# Patient Record
Sex: MALE | Race: WHITE | Employment: OTHER | URBAN - METROPOLITAN AREA
[De-identification: names, ages, dates, MRNs, and addresses within clinical notes are randomized per-mention and may not be internally consistent; named-entity substitution may affect disease eponyms.]

---

## 2019-03-03 ENCOUNTER — HOSPITAL ENCOUNTER (EMERGENCY)
Age: 84
Discharge: HOME | End: 2019-03-03
Payer: MEDICARE

## 2019-03-03 ENCOUNTER — APPOINTMENT (OUTPATIENT)
Dept: GENERAL RADIOLOGY | Age: 84
End: 2019-03-03
Payer: MEDICARE

## 2019-03-03 VITALS
RESPIRATION RATE: 16 BRPM | HEART RATE: 70 BPM | BODY MASS INDEX: 24.38 KG/M2 | HEIGHT: 72 IN | DIASTOLIC BLOOD PRESSURE: 94 MMHG | SYSTOLIC BLOOD PRESSURE: 171 MMHG | TEMPERATURE: 97.8 F | OXYGEN SATURATION: 96 % | WEIGHT: 180 LBS

## 2019-03-03 DIAGNOSIS — R33.9 URINARY RETENTION: Primary | ICD-10-CM

## 2019-03-03 LAB
BACTERIA: ABNORMAL /HPF
BILIRUBIN URINE: NEGATIVE
BLOOD, URINE: ABNORMAL
CLARITY: CLEAR
COLOR: YELLOW
GLUCOSE URINE: NEGATIVE MG/DL
KETONES, URINE: NEGATIVE MG/DL
LEUKOCYTE ESTERASE, URINE: NEGATIVE
MICROSCOPIC EXAMINATION: YES
NITRITE, URINE: NEGATIVE
PH UA: 7 (ref 5–8)
PROTEIN UA: NEGATIVE MG/DL
RBC UA: ABNORMAL /HPF (ref 0–2)
SPECIFIC GRAVITY UA: 1.01 (ref 1–1.03)
URINE TYPE: ABNORMAL
UROBILINOGEN, URINE: 0.2 E.U./DL
WBC UA: ABNORMAL /HPF (ref 0–5)

## 2019-03-03 PROCEDURE — 51702 INSERT TEMP BLADDER CATH: CPT

## 2019-03-03 PROCEDURE — 99283 EMERGENCY DEPT VISIT LOW MDM: CPT

## 2019-03-03 PROCEDURE — 81001 URINALYSIS AUTO W/SCOPE: CPT

## 2019-03-03 PROCEDURE — 74022 RADEX COMPL AQT ABD SERIES: CPT

## 2020-09-25 ENCOUNTER — APPOINTMENT (OUTPATIENT)
Dept: GENERAL RADIOLOGY | Age: 85
End: 2020-09-25
Payer: MEDICARE

## 2020-09-25 ENCOUNTER — APPOINTMENT (OUTPATIENT)
Dept: CT IMAGING | Age: 85
End: 2020-09-25
Payer: MEDICARE

## 2020-09-25 ENCOUNTER — HOSPITAL ENCOUNTER (OUTPATIENT)
Age: 85
Setting detail: OBSERVATION
Discharge: HOME | End: 2020-09-26
Attending: EMERGENCY MEDICINE | Admitting: INTERNAL MEDICINE
Payer: MEDICARE

## 2020-09-25 PROBLEM — I10 HTN (HYPERTENSION): Status: ACTIVE | Noted: 2020-09-25

## 2020-09-25 PROBLEM — G45.9 TIA (TRANSIENT ISCHEMIC ATTACK): Status: ACTIVE | Noted: 2020-09-25

## 2020-09-25 LAB
A/G RATIO: 1.4 (ref 1.1–2.2)
ALBUMIN SERPL-MCNC: 3.7 G/DL (ref 3.4–5)
ALP BLD-CCNC: 82 U/L (ref 40–129)
ALT SERPL-CCNC: 13 U/L (ref 10–40)
ANION GAP SERPL CALCULATED.3IONS-SCNC: 10 MMOL/L (ref 3–16)
AST SERPL-CCNC: 18 U/L (ref 15–37)
BASOPHILS ABSOLUTE: 0 K/UL (ref 0–0.2)
BASOPHILS RELATIVE PERCENT: 0.3 %
BILIRUB SERPL-MCNC: 0.4 MG/DL (ref 0–1)
BILIRUBIN URINE: NEGATIVE
BLOOD, URINE: NEGATIVE
BUN BLDV-MCNC: 28 MG/DL (ref 7–20)
CALCIUM SERPL-MCNC: 8.7 MG/DL (ref 8.3–10.6)
CHLORIDE BLD-SCNC: 102 MMOL/L (ref 99–110)
CLARITY: CLEAR
CO2: 23 MMOL/L (ref 21–32)
COLOR: YELLOW
CREAT SERPL-MCNC: 1.2 MG/DL (ref 0.8–1.3)
EKG ATRIAL RATE: 71 BPM
EKG DIAGNOSIS: NORMAL
EKG P-R INTERVAL: 232 MS
EKG Q-T INTERVAL: 394 MS
EKG QRS DURATION: 90 MS
EKG QTC CALCULATION (BAZETT): 428 MS
EKG R AXIS: -7 DEGREES
EKG T AXIS: 32 DEGREES
EKG VENTRICULAR RATE: 71 BPM
EOSINOPHILS ABSOLUTE: 0.3 K/UL (ref 0–0.6)
EOSINOPHILS RELATIVE PERCENT: 8.7 %
GFR AFRICAN AMERICAN: >60
GFR NON-AFRICAN AMERICAN: 56
GLOBULIN: 2.6 G/DL
GLUCOSE BLD-MCNC: 102 MG/DL (ref 70–99)
GLUCOSE URINE: NEGATIVE MG/DL
HCT VFR BLD CALC: 33.5 % (ref 40.5–52.5)
HEMOGLOBIN: 11.6 G/DL (ref 13.5–17.5)
KETONES, URINE: NEGATIVE MG/DL
LEUKOCYTE ESTERASE, URINE: NEGATIVE
LYMPHOCYTES ABSOLUTE: 0.5 K/UL (ref 1–5.1)
LYMPHOCYTES RELATIVE PERCENT: 15.8 %
MCH RBC QN AUTO: 37.8 PG (ref 26–34)
MCHC RBC AUTO-ENTMCNC: 34.7 G/DL (ref 31–36)
MCV RBC AUTO: 108.9 FL (ref 80–100)
MICROSCOPIC EXAMINATION: NORMAL
MONOCYTES ABSOLUTE: 0.5 K/UL (ref 0–1.3)
MONOCYTES RELATIVE PERCENT: 13.4 %
NEUTROPHILS ABSOLUTE: 2.1 K/UL (ref 1.7–7.7)
NEUTROPHILS RELATIVE PERCENT: 61.8 %
NITRITE, URINE: NEGATIVE
PDW BLD-RTO: 14.6 % (ref 12.4–15.4)
PH UA: 7 (ref 5–8)
PLATELET # BLD: 170 K/UL (ref 135–450)
PMV BLD AUTO: 7.8 FL (ref 5–10.5)
POTASSIUM SERPL-SCNC: 4.5 MMOL/L (ref 3.5–5.1)
PRO-BNP: 1073 PG/ML (ref 0–449)
PROTEIN UA: NEGATIVE MG/DL
RBC # BLD: 3.08 M/UL (ref 4.2–5.9)
SODIUM BLD-SCNC: 135 MMOL/L (ref 136–145)
SPECIFIC GRAVITY UA: 1.01 (ref 1–1.03)
TOTAL PROTEIN: 6.3 G/DL (ref 6.4–8.2)
TROPONIN: <0.01 NG/ML
URINE REFLEX TO CULTURE: NORMAL
URINE TYPE: NORMAL
UROBILINOGEN, URINE: 1 E.U./DL
WBC # BLD: 3.4 K/UL (ref 4–11)

## 2020-09-25 PROCEDURE — 83880 ASSAY OF NATRIURETIC PEPTIDE: CPT

## 2020-09-25 PROCEDURE — 6370000000 HC RX 637 (ALT 250 FOR IP): Performed by: INTERNAL MEDICINE

## 2020-09-25 PROCEDURE — 70450 CT HEAD/BRAIN W/O DYE: CPT

## 2020-09-25 PROCEDURE — 71046 X-RAY EXAM CHEST 2 VIEWS: CPT

## 2020-09-25 PROCEDURE — 84484 ASSAY OF TROPONIN QUANT: CPT

## 2020-09-25 PROCEDURE — 99285 EMERGENCY DEPT VISIT HI MDM: CPT

## 2020-09-25 PROCEDURE — 2580000003 HC RX 258: Performed by: INTERNAL MEDICINE

## 2020-09-25 PROCEDURE — 80053 COMPREHEN METABOLIC PANEL: CPT

## 2020-09-25 PROCEDURE — G0378 HOSPITAL OBSERVATION PER HR: HCPCS

## 2020-09-25 PROCEDURE — 81003 URINALYSIS AUTO W/O SCOPE: CPT

## 2020-09-25 PROCEDURE — 93005 ELECTROCARDIOGRAM TRACING: CPT | Performed by: EMERGENCY MEDICINE

## 2020-09-25 PROCEDURE — 93010 ELECTROCARDIOGRAM REPORT: CPT | Performed by: INTERNAL MEDICINE

## 2020-09-25 PROCEDURE — 85025 COMPLETE CBC W/AUTO DIFF WBC: CPT

## 2020-09-25 RX ORDER — VITAMIN E 268 MG
800 CAPSULE ORAL DAILY
Status: DISCONTINUED | OUTPATIENT
Start: 2020-09-26 | End: 2020-09-26 | Stop reason: HOSPADM

## 2020-09-25 RX ORDER — SODIUM CHLORIDE 0.9 % (FLUSH) 0.9 %
10 SYRINGE (ML) INJECTION PRN
Status: DISCONTINUED | OUTPATIENT
Start: 2020-09-25 | End: 2020-09-26 | Stop reason: HOSPADM

## 2020-09-25 RX ORDER — ASPIRIN 81 MG/1
81 TABLET ORAL DAILY
Status: DISCONTINUED | OUTPATIENT
Start: 2020-09-26 | End: 2020-09-26 | Stop reason: HOSPADM

## 2020-09-25 RX ORDER — CYANOCOBALAMIN 1000 UG/ML
1000 INJECTION INTRAMUSCULAR; SUBCUTANEOUS ONCE
COMMUNITY

## 2020-09-25 RX ORDER — ACETAMINOPHEN 325 MG/1
650 TABLET ORAL EVERY 6 HOURS PRN
Status: DISCONTINUED | OUTPATIENT
Start: 2020-09-25 | End: 2020-09-26 | Stop reason: HOSPADM

## 2020-09-25 RX ORDER — SODIUM CHLORIDE 0.9 % (FLUSH) 0.9 %
10 SYRINGE (ML) INJECTION EVERY 12 HOURS SCHEDULED
Status: DISCONTINUED | OUTPATIENT
Start: 2020-09-25 | End: 2020-09-26 | Stop reason: HOSPADM

## 2020-09-25 RX ORDER — PROMETHAZINE HYDROCHLORIDE 25 MG/1
12.5 TABLET ORAL EVERY 6 HOURS PRN
Status: DISCONTINUED | OUTPATIENT
Start: 2020-09-25 | End: 2020-09-26 | Stop reason: HOSPADM

## 2020-09-25 RX ORDER — ONDANSETRON 2 MG/ML
4 INJECTION INTRAMUSCULAR; INTRAVENOUS EVERY 6 HOURS PRN
Status: DISCONTINUED | OUTPATIENT
Start: 2020-09-25 | End: 2020-09-26 | Stop reason: HOSPADM

## 2020-09-25 RX ORDER — ACETAMINOPHEN 650 MG/1
650 SUPPOSITORY RECTAL EVERY 6 HOURS PRN
Status: DISCONTINUED | OUTPATIENT
Start: 2020-09-25 | End: 2020-09-26 | Stop reason: HOSPADM

## 2020-09-25 RX ORDER — POLYETHYLENE GLYCOL 3350 17 G/17G
17 POWDER, FOR SOLUTION ORAL DAILY PRN
Status: DISCONTINUED | OUTPATIENT
Start: 2020-09-25 | End: 2020-09-26 | Stop reason: HOSPADM

## 2020-09-25 RX ADMIN — LABETALOL HYDROCHLORIDE 300 MG: 200 TABLET, FILM COATED ORAL at 21:41

## 2020-09-25 RX ADMIN — SODIUM CHLORIDE, PRESERVATIVE FREE 10 ML: 5 INJECTION INTRAVENOUS at 21:42

## 2020-09-25 ASSESSMENT — PAIN SCALES - GENERAL
PAINLEVEL_OUTOF10: 0
PAINLEVEL_OUTOF10: 0

## 2020-09-25 NOTE — ED NOTES
Fall risk screening completed.  Fall risk bracelet applied to patient.  Non-skid socks provided and placed on patient. Dustin Barbosa fall risk is indicated using  dome light .  Based on score, a bed alarm was indicated and applied.  The call light is within the patient's reach, and instructions for use were provided.  The bed is in the lowest position with wheels locked.  The patient has been advised to notify staff, using the call light, if there is a need to get up or use restroom.  The patient verbalized understanding of safety precautions and how to contact staff for assistance.          Charley Fried RN  09/25/20 1152

## 2020-09-25 NOTE — ED PROVIDER NOTES
I independently performed a history and physical on Laura Blanco. All diagnostic, treatment, and disposition decisions were made by myself in conjunction with the advanced practice provider. I have participated in the medical decision making and directed the treatment plan and disposition of the patient. For further details of [address] [place] [address] emergency department encounter, please see the advanced practice provider's documentation. CHIEF COMPLAINT  Chief Complaint   Patient presents with    Altered Mental Status     squad called by daughter-in-law; pt acting more confused today than usual; pt also c/o dizziness       Briefly, Laura Blanco is a 80 y.o. male  who presents to the ED complaining of acting abnormally transiently, slurring his words and confused which is not normal for him. He is without any complaint now. This lasted under an hour. He has little recollection of the event except he says he had trouble with speech briefly. No history of stroke or TIA. No chest pain or SOB or headaches. No cough or fevers. FOCUSED PHYSICAL EXAMINATION  BP (!) 175/61   Pulse 69   Temp 97.3 °F (36.3 °C) (Oral)   Resp 13   SpO2 100%    Focused physical examination notable for no acute distress, well-appearing, well-nourished, normal speech and mentation without obvious facial droop, no obvious rash. No obvious cranial nerve deficits on my initial exam. RRR CTAB, abd soft NTND, no focal motor/sensory deficits. NIHSS is 0. No dysmetria or ataxia.     The 12 lead EKG was interpreted by me as follows:  Rate: normal with a rate of 71  Rhythm: electronic atrial pacer  Axis: normal  Intervals: narrow QRS  ST segments: no ST elevations or depressions  T waves: no abnormal inversions  Non-specific T wave changes: not present  Prior EKG comparison: EKG dated 11/30/12 is not significantly different    MDM:  Diagnostic considerations included stroke, TIA, intracranial bleed, trauma, infection/sepsis, medication side effect, intoxication/withdrawal, metabolic/electrolyte abnormalities    ED course was notable for what sounds like a TIA with complete resolution of symptoms. Not confused/disoriented anymore. UA neg, labs reassuring, EKG with electronic atrial pacer. Despite CXR report of edema vs infiltrate he has no SOB, ESTEVES, or other cardiopulmonary sx at all to suggest an acute process in the lungs. CLINICAL IMPRESSION  1. Altered mental status, unspecified altered mental status type        DISPOSITION  Yarelis Landa was admitted in fair condition. The plan is to admit to the hospital at this time under the hospitalist service. Hospitalist accepted the patient and will take over the patient's care. This chart was created using Dragon dictation software. Efforts were made by me to ensure accuracy, however some errors may be present due to limitations of this technology.             Viktoria Mathis MD  09/25/20 [address], [place]  09/25/20 4674

## 2020-09-25 NOTE — ED NOTES
PT PROVIDED URINAL FOR URINE SAMPLE. PT STATES HE WILL NOT BE ABLE TO URINATE QUITE YET.       Ed Rahul, VAN  09/25/20 1997

## 2020-09-25 NOTE — ED PROVIDER NOTES
CHIEF COMPLAINT  Altered Mental Status (squad called by daughter-in-law; pt acting more confused today than usual; pt also c/o dizziness)      HISTORY OF PRESENT ILLNESS  Lucy Marrero is a 80 y.o. male who presents to the ED complaining of altered mental status. Patient is nontoxic in appearance and in no acute distress. Patient presents to the emergency department via EMS. Patient unclear as to why he is actually in the emergency department. Patient does not elicit any complaints to me. Apparently, EMS was contacted by daughter-in-law as patient was acting \"more confused\". Patient denies any headache or visual disturbances. No lightheadedness or dizziness. No chest pain, shortness of breath or difficulty breathing. No abdominal pain. No nausea or vomiting or bowel bladder complaints. No recent cough or congestion or URI-like symptoms. No fever or chills. Following HPI assessment, family did arrive and reported that patient was outside mowing the grass. States that he had come inside and he was noted to be more \"spacey\". Family states that patient appears to be having a hard time getting the words out and his speech did not sound clear. Family reports that episode lasted approximately 10 minutes or so by the time EMS arrived, he was returning back to normal.  Upon their arrival to the emergency department at this time, family at the bedside reports that patient has returned to his baseline. No prior history of TIA or CVA reported. No other complaints, modifying factors or associated symptoms. Nursing notes reviewed. Past Medical History:   Diagnosis Date    CAD (coronary artery disease)     Hypertension      Past Surgical History:   Procedure Laterality Date    PACEMAKER INSERTION       History reviewed. No pertinent family history. Social History     Socioeconomic History    Marital status:       Spouse name: Not on file    Number of children: Not on file    Years of education: Not on file    Highest education level: Not on file   Occupational History    Not on file   Social Needs    Financial resource strain: Not on file    Food insecurity     Worry: Not on file     Inability: Not on file    Transportation needs     Medical: Not on file     Non-medical: Not on file   Tobacco Use    Smoking status: Never Smoker    Smokeless tobacco: Never Used   Substance and Sexual Activity    Alcohol use: Yes     Comment: ONCE A YEAR    Drug use: No    Sexual activity: Not on file   Lifestyle    Physical activity     Days per week: Not on file     Minutes per session: Not on file    Stress: Not on file   Relationships    Social connections     Talks on phone: Not on file     Gets together: Not on file     Attends Orthodoxy service: Not on file     Active member of club or organization: Not on file     Attends meetings of clubs or organizations: Not on file     Relationship status: Not on file    Intimate partner violence     Fear of current or ex partner: Not on file     Emotionally abused: Not on file     Physically abused: Not on file     Forced sexual activity: Not on file   Other Topics Concern    Not on file   Social History Narrative    Not on file     No current facility-administered medications for this encounter. Current Outpatient Medications   Medication Sig Dispense Refill    aspirin 81 MG tablet Take 81 mg by mouth daily.  labetalol (NORMODYNE) 300 MG tablet Take 300 mg by mouth 2 times daily.  Multiple Vitamins-Minerals (MULTIVITAMIN PO) Take  by mouth daily.  vitamin E 1000 UNITS capsule Take 1,000 Units by mouth daily.  Cyanocobalamin (VITAMIN B 12 PO) Take  by mouth daily.          No Known Allergies    REVIEW OF SYSTEMS  10 systems reviewed, pertinent positives per HPI otherwise noted to be negative    PHYSICAL EXAM  BP (!) 169/81   Pulse 72   Temp 97.3 °F (36.3 °C) (Oral)   Resp 18   SpO2 100%   GENERAL APPEARANCE: Awake and alert. Cooperative. No acute distress. HEAD: Normocephalic. Atraumatic. EYES: EOM's grossly intact. Bilateral conjunctiva clear and without exudate. No conjunctival injection bilaterally. No scleral icterus. ENT: Mucous membranes are moist. Bilateral tympanic membranes are clear. No hemotympanum. Posterior oropharynx is without erythema or exudate. Uvula is midline. NECK: Supple. Normal ROM. Trachea is midline. CHEST: Equal and symmetric chest rise and fall. HEART: Regular rate and rhythm without murmurs. LUNGS: Respirations unlabored. Speaking comfortably in full sentences. Bilateral lung sounds clear to auscultation without wheezing, rhonchi or rales. ABDOMEN: Soft, non-distended and non-tender. No hernias or masses appreciated. No organomegaly. No rebound or guarding. Bowel sounds audible and active in all four quadrants. EXTREMITIES: Moves all extremities equally and neurovascularly intact. No edema. SKIN: Pink,warm and dry. No acute rashes. NEUROLOGICAL: Alert and oriented x4, speech clear. CN's 2-12 intact. No gross facial drooping. Strength is 5/5 in all extremities and sensation intact. No ataxia. PSYCHIATRIC: Normal mood and affect. RADIOLOGY  Xr Chest (2 Vw)    Result Date: 9/25/2020  EXAMINATION: TWO XRAY VIEWS OF THE CHEST 9/25/2020 1:05 pm COMPARISON: 05/27/2006. HISTORY: ORDERING SYSTEM PROVIDED HISTORY: AMS TECHNOLOGIST PROVIDED HISTORY: Reason for exam:->AMS FINDINGS: The cardiomediastinal silhouette is unremarkable. Aortic vascular calcification. There is mild increase in the perihilar markings bilaterally, possibly mild perihilar edema or infiltrate. The lungs otherwise are clear. There is no pleural fluid. Left-sided transvenous pacer. No acute osseous findings. Mild increase in the perihilar markings bilaterally, possibly mild edema or infiltrate.      Ct Head Wo Contrast    Result Date: 9/25/2020  EXAMINATION: CT OF THE HEAD WITHOUT CONTRAST  9/25/2020 1:19 pm TECHNIQUE: CT of the head was performed without the administration of intravenous contrast. Dose modulation, iterative reconstruction, and/or weight based adjustment of the mA/kV was utilized to reduce the radiation dose to as low as reasonably achievable. COMPARISON: None. HISTORY: ORDERING SYSTEM PROVIDED HISTORY: AMS TECHNOLOGIST PROVIDED HISTORY: If patient is on cardiac monitor and/or pulse ox, they may be taken off cardiac monitor and pulse ox, left on O2 if currently on. All monitors reattached when patient returns to room. Has a \"code stroke\" or \"stroke alert\" been called? ->No Reason for exam:->AMS Reason for Exam: ams Acuity: Acute Type of Exam: Initial FINDINGS: BRAIN/VENTRICLES: There is no acute intracranial hemorrhage, mass effect or midline shift. No abnormal extra-axial fluid collection. The gray-white differentiation is maintained without evidence of an acute infarct. There is no evidence of hydrocephalus. There is mild diffuse volume loss with periventricular white matter subcortical white matter changes suggestive of small vessel ischemic change ORBITS: The visualized portion of the orbits demonstrate no acute abnormality. SINUSES: The visualized paranasal sinuses and mastoid air cells demonstrate no acute abnormality. SOFT TISSUES/SKULL:  No acute abnormality of the visualized skull or soft tissues. No acute intracranial abnormality. Mild volume loss with white matter changes bilaterally suggestive of small vessel ischemic change     NIH Stroke Scale     Interval: Baseline  Time: 12:44 PM  Person Administering Scale: Bertha Schlichter   Administer stroke scale items in the order listed. Record performance in each category after each subscale exam. Do not go back and change scores. Follow directions provided for each exam technique. Scores should reflect what the patient does, not what the clinician thinks the patient can do.  The clinician should record answers while administering the exam and work quickly. Except where indicated, the patient should not be coached (i.e., repeated requests to patient to make a special effort). 1a  Level of consciousness: 0=alert; keenly responsive   1b. LOC questions:  0=Performs both tasks correctly   1c. LOC commands: 0=Performs both tasks correctly   2. Best Gaze: 0=normal   3. Visual: 0=No visual loss   4. Facial Palsy: 0=Normal symmetric movement   5a. Motor left arm: 0=No drift, limb holds 90 (or 45) degrees for full 10 seconds   5b. Motor right arm: 0=No drift, limb holds 90 (or 45) degrees for full 10 seconds   6a. motor left le=No drift, limb holds 90 (or 45) degrees for full 10 seconds   6b  Motor right le=No drift, limb holds 90 (or 45) degrees for full 10 seconds   7. Limb Ataxia: 0=Absent   8. Sensory: 0=Normal; no sensory loss   9. Best Language:  0=No aphasia, normal   10. Dysarthria: 0=Normal   11. Extinction and Inattention: 0=No abnormality         Total:  0         ED COURSE  I have evaluated this patient in collaboration with Dr. Gregorio Wolff. Patient seen and evaluated. Old records reviewed. Patient presenting for evaluation of episode of altered mentation, concerning for TIA. Afebrile. Vital stable. Not tachycardic or tachypneic. Not hypoxic. Well-appearing. Urinalysis negative for hematuria or infection. CBC without leukocytosis, stable anemia. CMP with elevated BUN of 28, GFR 56 and otherwise stable. Troponin negative. EKG interpretation per attending, please see his documentation for additional details. BNP 1073. This was obtained following chest x-ray read which reported mild increase in the perihilar markings bilaterally, possibly mild edema or infiltrate. CT head negative for acute intracranial abnormality. A discussion was had with the patient and family regarding lab and imaging results as well as plan of care. Plan for admission to the hospital for further evaluation/treatment.   Patient verbalizes understanding, all questions were answered and he is agreeable with the plan of care. I spoke with Dr. Nick Ferro. We thoroughly discussed the history, physical exam, laboratory and imaging studies, as well as, emergency department course. Based upon that discussion, we've decided to admit Tristin Valdez to the hospital for further evaulation/treatment.         MDM  Results for orders placed or performed during the hospital encounter of 09/25/20   CBC auto differential   Result Value Ref Range    WBC 3.4 (L) 4.0 - 11.0 K/uL    RBC 3.08 (L) 4.20 - 5.90 M/uL    Hemoglobin 11.6 (L) 13.5 - 17.5 g/dL    Hematocrit 33.5 (L) 40.5 - 52.5 %    .9 (H) 80.0 - 100.0 fL    MCH 37.8 (H) 26.0 - 34.0 pg    MCHC 34.7 31.0 - 36.0 g/dL    RDW 14.6 12.4 - 15.4 %    Platelets 247 450 - 962 K/uL    MPV 7.8 5.0 - 10.5 fL    Neutrophils % 61.8 %    Lymphocytes % 15.8 %    Monocytes % 13.4 %    Eosinophils % 8.7 %    Basophils % 0.3 %    Neutrophils Absolute 2.1 1.7 - 7.7 K/uL    Lymphocytes Absolute 0.5 (L) 1.0 - 5.1 K/uL    Monocytes Absolute 0.5 0.0 - 1.3 K/uL    Eosinophils Absolute 0.3 0.0 - 0.6 K/uL    Basophils Absolute 0.0 0.0 - 0.2 K/uL   Comprehensive metabolic panel   Result Value Ref Range    Sodium 135 (L) 136 - 145 mmol/L    Potassium 4.5 3.5 - 5.1 mmol/L    Chloride 102 99 - 110 mmol/L    CO2 23 21 - 32 mmol/L    Anion Gap 10 3 - 16    Glucose 102 (H) 70 - 99 mg/dL    BUN 28 (H) 7 - 20 mg/dL    CREATININE 1.2 0.8 - 1.3 mg/dL    GFR Non- 56 (A) >60    GFR African American >60 >60    Calcium 8.7 8.3 - 10.6 mg/dL    Total Protein 6.3 (L) 6.4 - 8.2 g/dL    Alb 3.7 3.4 - 5.0 g/dL    Albumin/Globulin Ratio 1.4 1.1 - 2.2    Total Bilirubin 0.4 0.0 - 1.0 mg/dL    Alkaline Phosphatase 82 40 - 129 U/L    ALT 13 10 - 40 U/L    AST 18 15 - 37 U/L    Globulin 2.6 g/dL   Urinalysis Reflex to Culture    Specimen: Urine, clean catch   Result Value Ref Range    Color, UA Yellow Straw/Yellow    Clarity, UA Clear Clear Glucose, Ur Negative Negative mg/dL    Bilirubin Urine Negative Negative    Ketones, Urine Negative Negative mg/dL    Specific Gravity, UA 1.015 1.005 - 1.030    Blood, Urine Negative Negative    pH, UA 7.0 5.0 - 8.0    Protein, UA Negative Negative mg/dL    Urobilinogen, Urine 1.0 <2.0 E.U./dL    Nitrite, Urine Negative Negative    Leukocyte Esterase, Urine Negative Negative    Microscopic Examination Not Indicated     Urine Type NotGiven     Urine Reflex to Culture Not Indicated    Troponin   Result Value Ref Range    Troponin <0.01 <0.01 ng/mL   Brain Natriuretic Peptide   Result Value Ref Range    Pro-BNP 1,073 (H) 0 - 449 pg/mL   EKG 12 Lead   Result Value Ref Range    Ventricular Rate 71 BPM    Atrial Rate 71 BPM    P-R Interval 232 ms    QRS Duration 90 ms    Q-T Interval 394 ms    QTc Calculation (Bazett) 428 ms    R Axis -7 degrees    T Axis 32 degrees    Diagnosis       Electronic atrial pacemakerSeptal infarct (cited on or before 30-NOV-2012)Abnormal ECGWhen compared with ECG of 30-NOV-2012 10:49,Electronic atrial pacemaker has replaced Sinus rhythmQuestionable change in initial forces of Septal leads         Final Impression    1. Altered mental status, unspecified altered mental status type        Blood pressure (!) 156/107, pulse 81, temperature 97.3 °F (36.3 °C), temperature source Oral, resp. rate 23, SpO2 97 %. DISPOSITION  Patient was admitted to the hospital.       DISCLAIMER:  Please note this report has been produced using speech recognition Dragon software and may contain errors related to that system including errors in grammar, punctuation, and spelling, as well as words and phrases that may be inappropriate. If there are any questions or concerns please feel free to contact the dictating provider for clarification.                 Garcia Lopez, APRN - [address]   09/25/20 4609

## 2020-09-25 NOTE — ED NOTES
Bed: 07  Expected date:   Expected time:   Means of arrival:   Comments:  regulo Almonte RN  09/25/20 8955

## 2020-09-25 NOTE — H&P
Hospital Medicine History & Physical      PCP: DAVID Bird CNP    Date of Admission: 9/25/2020    Date of Service: Pt seen/examined on 9/25/2020 and Placed in Observation. Chief Complaint: Altered mental status      History Of Present Illness:    80 y.o. male who presented to [facility] with above complaints  Patient was apparently normal today while he was mowing the lawn. After working out in the yard, he went inside the house and daughter-in-law stated that the patient was acting abnormal.  Patient apparently had a blank look on his face, patient had a hard time getting any words out and his speech was unclear, patient reported left hand tingling. This lasted for about 10 minutes until EMS was called, and by the time patient arrived to the ED he was completely back to baseline. Patient reports he had an episode yesterday where the left hand was tingling. Otherwise denies any focal weakness or numbness. No facial droop reported. Patient denies prior history of stroke or TIA. He has history of hypertension for which he takes labetalol and has a pacemaker in situ. He reportedly takes baby aspirin every day at 5 AM.    Interestingly patient's BNP was elevated and chest x-ray shows possible pulmonary edema, but patient denies any exertional shortness of breath, no leg edema orthopnea or PND. He has had longstanding hypertension. -- Nuclear stress test June 7, 2006 - no evidence of ischemia and estimated ejection fraction of 43%. -- Echocardiogram August 12, 2003 - ejection fraction of 50%. Underwent a dual-chamber pacemaker implantation which was initially done on September 19, 2002.  Since the pacemaker implantation he has not had any episodes of near syncope or syncope      Past Medical History:          Diagnosis Date    CAD (coronary artery disease)     Hypertension        Past Surgical History:          Procedure Laterality Date    PACEMAKER INSERTION         Medications normal bowel sounds. Musculoskeletal:  No clubbing, cyanosis or edema bilaterally. Full range of motion without deformity. Skin: Skin color, texture, turgor normal.  No rashes or lesions. Neurologic:  Neurovascularly intact without any focal sensory/motor deficits. Cranial nerves: II-XII intact, grossly non-focal.  Psychiatric:  Alert and oriented, thought content appropriate, normal insight  Capillary Refill: Brisk,< 3 seconds   Peripheral Pulses: +2 palpable, equal bilaterally       Labs:     Recent Labs     09/25/20  1248   WBC 3.4*   HGB 11.6*   HCT 33.5*        Recent Labs     09/25/20  1248   *   K 4.5      CO2 23   BUN 28*   CREATININE 1.2   CALCIUM 8.7     Recent Labs     09/25/20  1248   AST 18   ALT 13   BILITOT 0.4   ALKPHOS 82     No results for input(s): INR in the last 72 hours. Recent Labs     09/25/20  1248   TROPONINI <0.01       Urinalysis:      Lab Results   Component Value Date    NITRU Negative 09/25/2020    WBCUA None seen 03/03/2019    BACTERIA Rare 03/03/2019    RBCUA 10-20 03/03/2019    BLOODU Negative 09/25/2020    SPECGRAV 1.015 09/25/2020    GLUCOSEU Negative 09/25/2020       Radiology:     Reviewed the CT head and chest x-ray personally and also reviewed radiologist dictation    EKG:  I have reviewed the EKG with the following interpretation: Electronic atrial pacemaker rhythm, PVC    CT HEAD WO CONTRAST   Final Result   No acute intracranial abnormality. Mild volume loss with white matter changes bilaterally suggestive of small   vessel ischemic change         XR CHEST (2 VW)   Final Result   Mild increase in the perihilar markings bilaterally, possibly mild edema or   infiltrate. MRI BRAIN WO CONTRAST    (Results Pending)   VL DUP CAROTID BILATERAL    (Results Pending)       ASSESSMENT:PLAN:    Suspected TIA  10 min episode of transient confusion, speech deficit, left hand tingling.   Spontaneous resolution with no other neurological deficits  Initial CT head negative  - Other DDx  including heme/metabolic derangements, encephalopathy, seizures, migraines, syncope seems less likely  - Neuroimaging -  MRI brain w/o contrast, carotid doppler, 2D ECHO ordered as indicated  - On ASA 81 mg daily  - EKG reviewed, order telemetry (review for Afib or other suggestion of cardiac embolic source)  - Aggressive risk factor management of HTN, HLD,   - f/u labs including lipid profile, A1C  - Consider neurology consult if MRI brain abnormal    Suspected CHF  No clinical symptoms of CHF. But BNP elevated 1073, abnormal CXR findings perihilar edema. Could have diastolic CHF based on longstanding hypertension  -2D echocardiogram ordered and pending    Longstanding hypertension  Elevated, not at goal.  Resume labetalol and increase dose if needed to achieve goal BP    SA node dysfunction  S/p PPM placement  Follows up with [facility]      DVT Prophylaxis: Lovenox  Diet: DIET GENERAL;  Code Status: Full Code    PT/OT Eval Status: Ambulatory    Dispo -observation       Saima Pozo MD    Thank you DAVID Marinelli - KELVIN for the opportunity to be involved in this patient's care. If you have any questions or concerns please feel free to contact me at [phone].

## 2020-09-25 NOTE — PROGRESS NOTES
4 Eyes Skin Assessment     The patient is being assess for   Admission    I agree that 2 RN's have performed a thorough Head to Toe Skin Assessment on the patient. ALL assessment sites listed below have been assessed. Areas assessed by both nurses:   [x]   Head, Face, and Ears   [x]   Shoulders, Back, and Chest, Abdomen  [x]   Arms, Elbows, and Hands   [x]   Coccyx, Sacrum, and Ischium  [x]   Legs, Feet, and Heels        Dry flaky heels, scattered scabs on arms, discoloration to bilat arms, coccyx red and blachable. **SHARE this note so that the co-signing nurse is able to place an eSignature**    Co-signer eSignature: Electronically signed by Charisse Peres RN on 9/25/20 at 10:21 PM EDT    Does the Patient have Skin Breakdown?   No          Solomon Prevention initiated:  No   Wound Care Orders initiated:  No      WOC nurse consulted for Pressure Injury (Stage 3,4, Unstageable, DTI, NWPT, Complex wounds)and New or Established Ostomies:  No      Primary Nurse eSignature: Electronically signed by Tamara Jones RN on 9/25/20 at 6:20 PM EDT

## 2020-09-25 NOTE — ED NOTES
Terrence Carroll@"[employer]  Re: admit.  AMS per [email or web address]  09/25/20 7091

## 2020-09-26 VITALS
OXYGEN SATURATION: 98 % | DIASTOLIC BLOOD PRESSURE: 74 MMHG | SYSTOLIC BLOOD PRESSURE: 165 MMHG | TEMPERATURE: 97.7 F | BODY MASS INDEX: 23.41 KG/M2 | WEIGHT: 172.84 LBS | HEART RATE: 69 BPM | RESPIRATION RATE: 16 BRPM | HEIGHT: 72 IN

## 2020-09-26 LAB
CHOLESTEROL, TOTAL: 186 MG/DL (ref 0–199)
HDLC SERPL-MCNC: 42 MG/DL (ref 40–60)
LDL CHOLESTEROL CALCULATED: 133 MG/DL
LV EF: 58 %
LVEF MODALITY: NORMAL
TRIGL SERPL-MCNC: 57 MG/DL (ref 0–150)
VLDLC SERPL CALC-MCNC: 11 MG/DL

## 2020-09-26 PROCEDURE — 36415 COLL VENOUS BLD VENIPUNCTURE: CPT

## 2020-09-26 PROCEDURE — 83036 HEMOGLOBIN GLYCOSYLATED A1C: CPT

## 2020-09-26 PROCEDURE — 96372 THER/PROPH/DIAG INJ SC/IM: CPT

## 2020-09-26 PROCEDURE — G0378 HOSPITAL OBSERVATION PER HR: HCPCS

## 2020-09-26 PROCEDURE — 93306 TTE W/DOPPLER COMPLETE: CPT

## 2020-09-26 PROCEDURE — 99219 PR INITIAL OBSERVATION CARE/DAY 50 MINUTES: CPT | Performed by: PSYCHIATRY & NEUROLOGY

## 2020-09-26 PROCEDURE — 2580000003 HC RX 258: Performed by: INTERNAL MEDICINE

## 2020-09-26 PROCEDURE — 6370000000 HC RX 637 (ALT 250 FOR IP): Performed by: INTERNAL MEDICINE

## 2020-09-26 PROCEDURE — 80061 LIPID PANEL: CPT

## 2020-09-26 PROCEDURE — 6360000002 HC RX W HCPCS: Performed by: INTERNAL MEDICINE

## 2020-09-26 RX ORDER — ATORVASTATIN CALCIUM 40 MG/1
40 TABLET, FILM COATED ORAL NIGHTLY
Status: DISCONTINUED | OUTPATIENT
Start: 2020-09-26 | End: 2020-09-26 | Stop reason: HOSPADM

## 2020-09-26 RX ORDER — ATORVASTATIN CALCIUM 40 MG/1
40 TABLET, FILM COATED ORAL NIGHTLY
Qty: 30 TABLET | Refills: 3 | Status: SHIPPED | OUTPATIENT
Start: 2020-09-26

## 2020-09-26 RX ADMIN — SODIUM CHLORIDE, PRESERVATIVE FREE 10 ML: 5 INJECTION INTRAVENOUS at 09:22

## 2020-09-26 RX ADMIN — ASPIRIN 81 MG: 81 TABLET, COATED ORAL at 09:19

## 2020-09-26 RX ADMIN — Medication 800 UNITS: at 09:24

## 2020-09-26 RX ADMIN — ENOXAPARIN SODIUM 40 MG: 40 INJECTION SUBCUTANEOUS at 09:19

## 2020-09-26 RX ADMIN — LABETALOL HYDROCHLORIDE 300 MG: 200 TABLET, FILM COATED ORAL at 09:19

## 2020-09-26 ASSESSMENT — PAIN SCALES - GENERAL: PAINLEVEL_OUTOF10: 0

## 2020-09-26 NOTE — CONSULTS
In patient Neurology consult        [facility] Neurology      MD Paty Ball  3/17/1924    Date of Service: 9/26/2020    Referring Physician: Anyi Nieto MD      Reason for the consult and CC: Acute encephalopathy and speech impairment. Possible TIA. HPI:   The patient is a 80y.o.  years old male with history of hypertension, CAD and status post pacemaker placement who was admitted to the hospital yesterday with acute encephalopathy and speech impairment. Symptoms started yesterday few hours prior to admission. He was working out in his yard when he suddenly came inside his house and was somewhat acting not normal.  He was not following direction with a staring look in his face and slurred speech. He was having word finding difficulties. Degree was severe. Duration was at least 10 to 15 minutes. Other associated symptoms included left hand tingling. No headache, chest pain, dysphagia or focal weakness. No falling or injury. No other leaving aggravating factors. Paramedics arrived and the patient was taken to the ER where he was back to his baseline. Initial CT of the head showed no acute findings. He was admitted. Today he denies any new symptoms. No headache, chest pain, dysphagia or dysarthria. No prior history of strokes. Other review of system was unremarkable.       Family history: Noncontributory      Past Surgical History:   Procedure Laterality Date    PACEMAKER INSERTION          Past Medical History:   Diagnosis Date    CAD (coronary artery disease)     Hypertension      Social History     Tobacco Use    Smoking status: Never Smoker    Smokeless tobacco: Never Used   Substance Use Topics    Alcohol use: Yes     Comment: ONCE A YEAR    Drug use: No     No Known Allergies  Current Facility-Administered Medications   Medication Dose Route Frequency Provider Last Rate Last Dose    atorvastatin (LIPITOR) tablet 40 mg  40 mg Oral Nightly Angela Torre APRN - CNP        aspirin EC tablet 81 mg  81 mg Oral Daily Genevieve Hampton MD   81 mg at 09/26/20 0919    labetalol (NORMODYNE) tablet 300 mg  300 mg Oral BID Genevieve Hampton MD   300 mg at 09/26/20 0919    vitamin E capsule 800 Units  800 Units Oral Daily Genevieve Hampton MD   800 Units at 09/26/20 2974    sodium chloride flush 0.9 % injection 10 mL  10 mL Intravenous 2 times per day Genevieve Hampton MD   10 mL at 09/26/20 2535    sodium chloride flush 0.9 % injection 10 mL  10 mL Intravenous PRN Genevieve Hampton MD        acetaminophen (TYLENOL) tablet 650 mg  650 mg Oral Q6H PRN Genevieve Hampton MD        Or   Mohan acetaminophen (TYLENOL) suppository 650 mg  650 mg Rectal Q6H PRN Genevieve Hampton MD        polyethylene glycol (GLYCOLAX) packet 17 g  17 g Oral Daily PRN Genevieve Hampton MD        promethazine (PHENERGAN) tablet 12.5 mg  12.5 mg Oral Q6H PRN Genevieve Hampton MD        Or    ondansetron (ZOFRAN) injection 4 mg  4 mg Intravenous Q6H PRN Genevieve Hampton MD        enoxaparin (LOVENOX) injection 40 mg  40 mg Subcutaneous Daily Genevieve Hampton MD   40 mg at 09/26/20 0919       ROS : A 10-14 system review of constitutional, cardiovascular, respiratory, eyes, musculoskeletal, endocrine, GI, ENT, skin, hematological, genitourinary, psychiatric and neurologic systems was obtained and updated today and is unremarkable except as mentioned in my HPI      Exam:     Constitutional:   Vitals:    09/25/20 2331 09/26/20 0345 09/26/20 0745 09/26/20 0900   BP: 133/69 137/75 (!) 165/74    Pulse: 63 67 69    Resp: 16 16 16    Temp: 97.9 °F (36.6 °C) 99 °F (37.2 °C) 97.7 °F (36.5 °C)    TempSrc: Oral Oral Oral    SpO2: 96% 97% 98%    Weight:    172 lb 13.5 oz (78.4 kg)   Height:           General appearance:  Normal development and appear in no acute distress. Eye: No icterus.    Fundus: Funduscopic examination cannot be performed due to COVID19 restrictions and precautions. Neck: supple  Cardiovascular: No lower leg edema with good pulsation. Mental Status:   Oriented to person, place, problem, and time. Memory: Aware of recent and remote event. Good immediate recall. Intact remote memory  Normal attention span and concentration. Language: intact naming, repeating and fluency   Good fund of Knowledge. Aware of current events and vocabulary   Cranial Nerves:   II: Visual fields: Full. Pupils: equal, round, reactive to light  III,IV,VI: Extra Ocular Movements are intact. No nystagmus  V: Facial sensation is intact   VII: Facial strength and movements: intact and symmetric  VIII: Hearing: Intact to finger rub bilaterally  IX: Palate elevation is symmetric  XI: Shoulder shrug is intact  XII: Tongue movements are normal  Musculoskeletal: 5/5 in all 4 extremities. Tone: Normal tone. Reflexes: 2+ in the arms 1+ in the leg Planters: flexor bilaterally. Coordination: no pronator drift, no dysmetria with FNF in upper extremities. Normal REM. Sensation: normal to all modalities in both arms and legs.   Gait/Posture: Not tested due to poor cooperation with the patient   Data:  LABS:   Lab Results   Component Value Date     09/25/2020    K 4.5 09/25/2020     09/25/2020    CO2 23 09/25/2020    BUN 28 09/25/2020    CREATININE 1.2 09/25/2020    GFRAA >60 09/25/2020    GFRAA >60 11/30/2012    LABGLOM 56 09/25/2020    GLUCOSE 102 09/25/2020    CALCIUM 8.7 09/25/2020     Lab Results   Component Value Date    WBC 3.4 09/25/2020    RBC 3.08 09/25/2020    HGB 11.6 09/25/2020    HCT 33.5 09/25/2020    .9 09/25/2020    RDW 14.6 09/25/2020     09/25/2020     Lab Results   Component Value Date    INR 0.98 11/30/2012    PROTIME 11.2 11/30/2012       Neuroimaging were independently reviewed by myself and discussed results with the patient  Reviewed notes from different physicians  Reviewed lab and blood testing    Impression:  Acute encephalopathy and speech impairment, severe. Likely TIA versus CVA. Less likely seizure  Hypertension, not controlled  CAD  Pacemaker placement  Hyponatremia    Recommendation:  MRI brain if pacemaker is compatible with MRI  Echo  Carotid Doppler  PT and OT  Aspirin  Statin  Hydration and follow electrolytes  Blood pressure monitor and resume home blood pressure medications  Speech evaluation  Follow sodium level  DVT and GI prophylaxis  Stroke prevention was discussed with the patient and his son today  Follow-up with his cardiologist regarding his pacemaker and possible interrogation  Can be discharged when medically stable  No need to change AP therapy at this point          Thank you for referring such patient. If you have any questions regarding my consult note, please don't hesitate to call me. Chula Reza MD  [phone]    This dictation was generated by voice recognition computer software.  Although all attempts are made to edit the dictation for accuracy, there may be errors in the  transcription that are not intended

## 2020-09-26 NOTE — PLAN OF CARE
Bed in lowest position, wheels locked, 2/4 side rails up, nonskid footwear on. Bed/ chair check alarm in place, call light within reach. Pt instructed to call out when needing assistance. Pt stated understanding. Nurse will continue to monitor.         Problem: Falls - Risk of:  Goal: Will remain free from falls  Description: Will remain free from falls  Outcome: Ongoing     Problem: Falls - Risk of:  Goal: Absence of physical injury  Description: Absence of physical injury  Outcome: Ongoing

## 2020-09-26 NOTE — PLAN OF CARE
Discharge instructions given with good understanding shown to patient and son. Discharged via wc to car @ front door with belongings. Condition satisfactory on discharge.

## 2020-09-27 LAB
ESTIMATED AVERAGE GLUCOSE: 114 MG/DL
HBA1C MFR BLD: 5.6 %

## 2020-09-30 ENCOUNTER — HOSPITAL ENCOUNTER (OUTPATIENT)
Dept: VASCULAR LAB | Age: 85
Discharge: HOME | End: 2020-09-30
Payer: MEDICARE

## 2020-09-30 PROCEDURE — 93880 EXTRACRANIAL BILAT STUDY: CPT

## 2020-09-30 NOTE — DISCHARGE SUMMARY
Hospital Medicine Discharge Summary    Patient ID: Tato Esters      Patient's PCP: DAVID Villasenor CNP    Admit Date: 9/25/2020     Discharge Date: 9/26/2020      Admitting Physician: Didi Mitchell MD     Discharge Physician: DAVID Ibarra CNP     Discharge Diagnoses: Active Hospital Problems    Diagnosis    Altered mental status [R41.82]    TIA (transient ischemic attack) [G45.9]    HTN (hypertension), benign [I10]    Sinoatrial node dysfunction ([address] [I49.5]    Cardiac pacemaker in situ [Z95.0]       The patient was seen and examined on day of discharge and this discharge summary is in conjunction with any daily progress note from day of discharge. Hospital Course:     80 y.o. male who presented to [facility] with above complaints  Patient was apparently normal today while he was mowing the lawn. After working out in the yard, he went inside the house and daughter-in-law stated that the patient was acting abnormal.  Patient apparently had a blank look on his face, patient had a hard time getting any words out and his speech was unclear, patient reported left hand tingling. This lasted for about 10 minutes until EMS was called, and by the time patient arrived to the ED he was completely back to baseline. Patient reports he had an episode yesterday where the left hand was tingling. Otherwise denies any focal weakness or numbness. No facial droop reported. Patient denies prior history of stroke or TIA. He has history of hypertension for which he takes labetalol and has a pacemaker in situ. He reportedly takes baby aspirin every day at 5 AM.       Suspected TIA  10 min episode of transient confusion, speech deficit, left hand tingling.   Spontaneous resolution with no other neurological deficits  Initial CT head negative  - Other DDx  including heme/metabolic derangements, encephalopathy, seizures, migraines, syncope seems less likely  - unable to have MRI given incompatible pacemaker.  - ECHO - EF 55-60% with grade II DD.    - CUS not available on the weekend. Discussed with the patient and son at the bedside. The patient wishes to be discharged and have an outpatient CUS. Informed patient we are looking for blockage in the carotid arteries that could lead to stroke. He is aware and so is the son and they wish to have procedure as outpatient, which I ordered. - On ASA 81 mg daily  - neurology consulted. - Aggressive risk factor management of HTN, HLD,   - , A1c 5.6.     Suspected CHF  No clinical symptoms of CHF. But BNP elevated 1073, abnormal CXR findings perihilar edema. Longstanding hypertension  Elevated, not at goal.  Resume labetalol.      SA node dysfunction  S/p PPM placement  Follows up with [facility]               Physical Exam Performed:     BP (!) 165/74   Pulse 69   Temp 97.7 °F (36.5 °C) (Oral)   Resp 16   Ht 6' (1.829 m)   Wt 172 lb 13.5 oz (78.4 kg)   SpO2 98%   BMI 23.44 kg/m²       General appearance:  No apparent distress, appears stated age and cooperative. HEENT:  Normal cephalic, atraumatic without obvious deformity. Pupils equal, round, and reactive to light. Extra ocular muscles intact. Conjunctivae/corneas clear. Neck: Supple, with full range of motion. No jugular venous distention. Trachea midline. Respiratory:  Normal respiratory effort. Clear to auscultation, bilaterally without Rales/Wheezes/Rhonchi. Cardiovascular:  Regular rate and rhythm with normal S1/S2 without murmurs, rubs or gallops. Abdomen: Soft, non-tender, non-distended with normal bowel sounds. Musculoskeletal:  No clubbing, cyanosis or edema bilaterally. Full range of motion without deformity. Skin: Skin color, texture, turgor normal.  No rashes or lesions. Neurologic:  Neurovascularly intact without any focal sensory/motor deficits.  Cranial nerves: II-XII intact, grossly non-focal.  Psychiatric:  Alert and oriented, thought minutes in the examination, evaluation, counseling and review of medications and discharge plan. Signed:    DAVID Franco CNP   9/30/2020      Thank you DAVID Deleon CNP for the opportunity to be involved in this patient's care. If you have any questions or concerns please feel free to contact me at [phone].

## 2021-01-01 ENCOUNTER — APPOINTMENT (OUTPATIENT)
Dept: CT IMAGING | Age: 86
DRG: 480 | End: 2021-01-01
Payer: MEDICARE

## 2021-01-01 ENCOUNTER — APPOINTMENT (OUTPATIENT)
Dept: GENERAL RADIOLOGY | Age: 86
DRG: 480 | End: 2021-01-01
Payer: MEDICARE

## 2021-01-01 ENCOUNTER — APPOINTMENT (OUTPATIENT)
Dept: GENERAL RADIOLOGY | Age: 86
DRG: 195 | End: 2021-01-01
Payer: MEDICARE

## 2021-01-01 ENCOUNTER — APPOINTMENT (OUTPATIENT)
Dept: CT IMAGING | Age: 86
End: 2021-01-01
Payer: MEDICARE

## 2021-01-01 ENCOUNTER — HOSPITAL ENCOUNTER (EMERGENCY)
Age: 86
Discharge: HOME | End: 2021-05-24
Attending: EMERGENCY MEDICINE
Payer: MEDICARE

## 2021-01-01 ENCOUNTER — HOSPITAL ENCOUNTER (INPATIENT)
Age: 86
LOS: 3 days | Discharge: HOME HEALTH | DRG: 195 | End: 2021-04-02
Attending: EMERGENCY MEDICINE | Admitting: INTERNAL MEDICINE
Payer: MEDICARE

## 2021-01-01 ENCOUNTER — ANESTHESIA EVENT (OUTPATIENT)
Dept: OPERATING ROOM | Age: 86
DRG: 480 | End: 2021-01-01
Payer: MEDICARE

## 2021-01-01 ENCOUNTER — ANESTHESIA (OUTPATIENT)
Dept: OPERATING ROOM | Age: 86
DRG: 480 | End: 2021-01-01
Payer: MEDICARE

## 2021-01-01 ENCOUNTER — HOSPITAL ENCOUNTER (OUTPATIENT)
Dept: INTERVENTIONAL RADIOLOGY/VASCULAR | Age: 86
Discharge: HOME | End: 2021-01-14
Payer: MEDICARE

## 2021-01-01 ENCOUNTER — HOSPITAL ENCOUNTER (INPATIENT)
Age: 86
LOS: 1 days | Discharge: NURSING, REHAB OR LONG-TERM CARE | DRG: 480 | End: 2021-11-15
Attending: STUDENT IN AN ORGANIZED HEALTH CARE EDUCATION/TRAINING PROGRAM | Admitting: INTERNAL MEDICINE
Payer: MEDICARE

## 2021-01-01 ENCOUNTER — HOSPITAL ENCOUNTER (OUTPATIENT)
Dept: CT IMAGING | Age: 86
Discharge: HOME | End: 2021-01-14
Payer: MEDICARE

## 2021-01-01 ENCOUNTER — HOSPITAL ENCOUNTER (EMERGENCY)
Age: 86
Discharge: HOME | End: 2021-06-19
Attending: EMERGENCY MEDICINE
Payer: MEDICARE

## 2021-01-01 VITALS
SYSTOLIC BLOOD PRESSURE: 156 MMHG | HEIGHT: 72 IN | BODY MASS INDEX: 21.67 KG/M2 | TEMPERATURE: 97.5 F | RESPIRATION RATE: 16 BRPM | WEIGHT: 160 LBS | OXYGEN SATURATION: 97 % | DIASTOLIC BLOOD PRESSURE: 65 MMHG | HEART RATE: 69 BPM

## 2021-01-01 VITALS
HEART RATE: 73 BPM | BODY MASS INDEX: 22.35 KG/M2 | WEIGHT: 165 LBS | TEMPERATURE: 98.4 F | DIASTOLIC BLOOD PRESSURE: 66 MMHG | OXYGEN SATURATION: 97 % | HEIGHT: 72 IN | SYSTOLIC BLOOD PRESSURE: 176 MMHG | RESPIRATION RATE: 16 BRPM

## 2021-01-01 VITALS — OXYGEN SATURATION: 100 % | SYSTOLIC BLOOD PRESSURE: 81 MMHG | DIASTOLIC BLOOD PRESSURE: 40 MMHG

## 2021-01-01 VITALS
TEMPERATURE: 97.9 F | SYSTOLIC BLOOD PRESSURE: 148 MMHG | WEIGHT: 160 LBS | BODY MASS INDEX: 21.67 KG/M2 | HEART RATE: 59 BPM | RESPIRATION RATE: 18 BRPM | OXYGEN SATURATION: 97 % | DIASTOLIC BLOOD PRESSURE: 61 MMHG | HEIGHT: 72 IN

## 2021-01-01 VITALS
SYSTOLIC BLOOD PRESSURE: 121 MMHG | DIASTOLIC BLOOD PRESSURE: 92 MMHG | OXYGEN SATURATION: 96 % | TEMPERATURE: 98.2 F | WEIGHT: 184.4 LBS | HEIGHT: 72 IN | BODY MASS INDEX: 24.98 KG/M2 | RESPIRATION RATE: 18 BRPM | HEART RATE: 65 BPM

## 2021-01-01 VITALS
SYSTOLIC BLOOD PRESSURE: 168 MMHG | OXYGEN SATURATION: 96 % | DIASTOLIC BLOOD PRESSURE: 61 MMHG | BODY MASS INDEX: 23.66 KG/M2 | WEIGHT: 174.7 LBS | HEART RATE: 63 BPM | HEIGHT: 72 IN | RESPIRATION RATE: 16 BRPM | TEMPERATURE: 97.2 F

## 2021-01-01 DIAGNOSIS — S72.141A CLOSED DISPLACED INTERTROCHANTERIC FRACTURE OF RIGHT FEMUR, INITIAL ENCOUNTER (HCC): Primary | ICD-10-CM

## 2021-01-01 DIAGNOSIS — M54.16 LUMBAR RADICULOPATHY: ICD-10-CM

## 2021-01-01 DIAGNOSIS — R33.9 URINARY RETENTION: ICD-10-CM
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
## 2021-01-01 DIAGNOSIS — N17.9 AKI (ACUTE KIDNEY INJURY) (HCC): ICD-10-CM

## 2021-01-01 DIAGNOSIS — R51.9 NONINTRACTABLE HEADACHE, UNSPECIFIED CHRONICITY PATTERN, UNSPECIFIED HEADACHE TYPE: Primary | ICD-10-CM

## 2021-01-01 DIAGNOSIS — R09.89 PULMONARY CONGESTION: ICD-10-CM

## 2021-01-01 DIAGNOSIS — R53.1 GENERALIZED WEAKNESS: ICD-10-CM

## 2021-01-01 DIAGNOSIS — S51.012A SKIN TEAR OF LEFT ELBOW WITHOUT COMPLICATION, INITIAL ENCOUNTER: ICD-10-CM

## 2021-01-01 DIAGNOSIS — R42 DIZZINESS: ICD-10-CM

## 2021-01-01 DIAGNOSIS — K59.00 CONSTIPATION, UNSPECIFIED CONSTIPATION TYPE: Primary | ICD-10-CM

## 2021-01-01 DIAGNOSIS — H54.7 DECREASED VISUAL ACUITY: ICD-10-CM

## 2021-01-01 DIAGNOSIS — J18.9 PNEUMONIA DUE TO ORGANISM: Primary | ICD-10-CM

## 2021-01-01 LAB
A/G RATIO: 1.1 (ref 1.1–2.2)
A/G RATIO: 1.2 (ref 1.1–2.2)
A/G RATIO: 1.3 (ref 1.1–2.2)
A/G RATIO: 1.4 (ref 1.1–2.2)
ABO/RH: NORMAL
ALBUMIN SERPL-MCNC: 3.4 G/DL (ref 3.4–5)
ALBUMIN SERPL-MCNC: 3.5 G/DL (ref 3.4–5)
ALBUMIN SERPL-MCNC: 3.8 G/DL (ref 3.4–5)
ALBUMIN SERPL-MCNC: 3.8 G/DL (ref 3.4–5)
ALP BLD-CCNC: 100 U/L (ref 40–129)
ALP BLD-CCNC: 77 U/L (ref 40–129)
ALP BLD-CCNC: 88 U/L (ref 40–129)
ALP BLD-CCNC: 95 U/L (ref 40–129)
ALT SERPL-CCNC: 10 U/L (ref 10–40)
ALT SERPL-CCNC: 17 U/L (ref 10–40)
ALT SERPL-CCNC: 22 U/L (ref 10–40)
ALT SERPL-CCNC: 9 U/L (ref 10–40)
ANION GAP SERPL CALCULATED.3IONS-SCNC: 10 MMOL/L (ref 3–16)
ANION GAP SERPL CALCULATED.3IONS-SCNC: 14 MMOL/L (ref 3–16)
ANION GAP SERPL CALCULATED.3IONS-SCNC: 7 MMOL/L (ref 3–16)
ANION GAP SERPL CALCULATED.3IONS-SCNC: 9 MMOL/L (ref 3–16)
ANISOCYTOSIS: ABNORMAL
ANISOCYTOSIS: ABNORMAL
ANTIBODY SCREEN: NORMAL
AST SERPL-CCNC: 16 U/L (ref 15–37)
AST SERPL-CCNC: 21 U/L (ref 15–37)
AST SERPL-CCNC: 22 U/L (ref 15–37)
AST SERPL-CCNC: 26 U/L (ref 15–37)
BASOPHILS ABSOLUTE: 0 K/UL (ref 0–0.2)
BASOPHILS RELATIVE PERCENT: 0.2 %
BASOPHILS RELATIVE PERCENT: 0.3 %
BASOPHILS RELATIVE PERCENT: 0.7 %
BASOPHILS RELATIVE PERCENT: 0.7 %
BILIRUB SERPL-MCNC: 0.4 MG/DL (ref 0–1)
BILIRUB SERPL-MCNC: 0.6 MG/DL (ref 0–1)
BILIRUBIN URINE: NEGATIVE
BILIRUBIN URINE: NEGATIVE
BLOOD CULTURE, ROUTINE: NORMAL
BLOOD, URINE: ABNORMAL
BLOOD, URINE: NEGATIVE
BUN BLDV-MCNC: 23 MG/DL (ref 7–20)
BUN BLDV-MCNC: 24 MG/DL (ref 7–20)
BUN BLDV-MCNC: 24 MG/DL (ref 7–20)
BUN BLDV-MCNC: 27 MG/DL (ref 7–20)
BUN BLDV-MCNC: 29 MG/DL (ref 7–20)
BUN BLDV-MCNC: 31 MG/DL (ref 7–20)
BUN BLDV-MCNC: 43 MG/DL (ref 7–20)
CALCIUM SERPL-MCNC: 8 MG/DL (ref 8.3–10.6)
CALCIUM SERPL-MCNC: 8.6 MG/DL (ref 8.3–10.6)
CALCIUM SERPL-MCNC: 8.8 MG/DL (ref 8.3–10.6)
CALCIUM SERPL-MCNC: 9.1 MG/DL (ref 8.3–10.6)
CALCIUM SERPL-MCNC: 9.2 MG/DL (ref 8.3–10.6)
CHLORIDE BLD-SCNC: 101 MMOL/L (ref 99–110)
CHLORIDE BLD-SCNC: 101 MMOL/L (ref 99–110)
CHLORIDE BLD-SCNC: 103 MMOL/L (ref 99–110)
CHLORIDE BLD-SCNC: 104 MMOL/L (ref 99–110)
CHLORIDE BLD-SCNC: 98 MMOL/L (ref 99–110)
CLARITY: CLEAR
CLARITY: CLEAR
CO2: 18 MMOL/L (ref 21–32)
CO2: 22 MMOL/L (ref 21–32)
CO2: 23 MMOL/L (ref 21–32)
CO2: 24 MMOL/L (ref 21–32)
CO2: 24 MMOL/L (ref 21–32)
CO2: 25 MMOL/L (ref 21–32)
CO2: 25 MMOL/L (ref 21–32)
COLOR: YELLOW
COLOR: YELLOW
CREAT SERPL-MCNC: 1 MG/DL (ref 0.8–1.3)
CREAT SERPL-MCNC: 1 MG/DL (ref 0.8–1.3)
CREAT SERPL-MCNC: 1.1 MG/DL (ref 0.8–1.3)
CREAT SERPL-MCNC: 1.2 MG/DL (ref 0.8–1.3)
CREAT SERPL-MCNC: 1.2 MG/DL (ref 0.8–1.3)
CREAT SERPL-MCNC: 1.4 MG/DL (ref 0.8–1.3)
CREAT SERPL-MCNC: 2.4 MG/DL (ref 0.8–1.3)
CULTURE, BLOOD 2: NORMAL
EKG ATRIAL RATE: 288 BPM
EKG ATRIAL RATE: 64 BPM
EKG ATRIAL RATE: 72 BPM
EKG ATRIAL RATE: 72 BPM
EKG DIAGNOSIS: NORMAL
EKG P AXIS: 51 DEGREES
EKG P AXIS: 72 DEGREES
EKG P-R INTERVAL: 264 MS
EKG P-R INTERVAL: 272 MS
EKG P-R INTERVAL: 312 MS
EKG Q-T INTERVAL: 400 MS
EKG Q-T INTERVAL: 444 MS
EKG Q-T INTERVAL: 446 MS
EKG Q-T INTERVAL: 476 MS
EKG QRS DURATION: 132 MS
EKG QRS DURATION: 146 MS
EKG QRS DURATION: 146 MS
EKG QRS DURATION: 90 MS
EKG QTC CALCULATION (BAZETT): 438 MS
EKG QTC CALCULATION (BAZETT): 444 MS
EKG QTC CALCULATION (BAZETT): 488 MS
EKG QTC CALCULATION (BAZETT): 510 MS
EKG R AXIS: -34 DEGREES
EKG R AXIS: -5 DEGREES
EKG R AXIS: -53 DEGREES
EKG R AXIS: -77 DEGREES
EKG T AXIS: -4 DEGREES
EKG T AXIS: 12 DEGREES
EKG T AXIS: 25 DEGREES
EKG T AXIS: 92 DEGREES
EKG VENTRICULAR RATE: 60 BPM
EKG VENTRICULAR RATE: 69 BPM
EKG VENTRICULAR RATE: 72 BPM
EKG VENTRICULAR RATE: 72 BPM
EOSINOPHILS ABSOLUTE: 0 K/UL (ref 0–0.6)
EOSINOPHILS ABSOLUTE: 0 K/UL (ref 0–0.6)
EOSINOPHILS ABSOLUTE: 0.1 K/UL (ref 0–0.6)
EOSINOPHILS ABSOLUTE: 0.3 K/UL (ref 0–0.6)
EOSINOPHILS ABSOLUTE: 0.4 K/UL (ref 0–0.6)
EOSINOPHILS ABSOLUTE: 0.4 K/UL (ref 0–0.6)
EOSINOPHILS ABSOLUTE: 0.6 K/UL (ref 0–0.6)
EOSINOPHILS RELATIVE PERCENT: 0.1 %
EOSINOPHILS RELATIVE PERCENT: 0.9 %
EOSINOPHILS RELATIVE PERCENT: 1 %
EOSINOPHILS RELATIVE PERCENT: 10 %
EOSINOPHILS RELATIVE PERCENT: 10 %
EOSINOPHILS RELATIVE PERCENT: 11.8 %
EOSINOPHILS RELATIVE PERCENT: 5.5 %
FOLATE: 8.16 NG/ML (ref 4.78–24.2)
GFR AFRICAN AMERICAN: 30
GFR AFRICAN AMERICAN: 57
GFR AFRICAN AMERICAN: >60
GFR NON-AFRICAN AMERICAN: 25
GFR NON-AFRICAN AMERICAN: 47
GFR NON-AFRICAN AMERICAN: 56
GFR NON-AFRICAN AMERICAN: 56
GFR NON-AFRICAN AMERICAN: >60
GLOBULIN: 2.8 G/DL
GLOBULIN: 2.8 G/DL
GLOBULIN: 3.4 G/DL
GLUCOSE BLD-MCNC: 105 MG/DL (ref 70–99)
GLUCOSE BLD-MCNC: 107 MG/DL (ref 70–99)
GLUCOSE BLD-MCNC: 110 MG/DL (ref 70–99)
GLUCOSE BLD-MCNC: 124 MG/DL (ref 70–99)
GLUCOSE BLD-MCNC: 139 MG/DL (ref 70–99)
GLUCOSE BLD-MCNC: 142 MG/DL (ref 70–99)
GLUCOSE BLD-MCNC: 164 MG/DL (ref 70–99)
GLUCOSE BLD-MCNC: 90 MG/DL (ref 70–99)
GLUCOSE URINE: NEGATIVE MG/DL
GLUCOSE URINE: NEGATIVE MG/DL
HCT VFR BLD CALC: 23.2 % (ref 40.5–52.5)
HCT VFR BLD CALC: 29.2 % (ref 40.5–52.5)
HCT VFR BLD CALC: 30.8 % (ref 40.5–52.5)
HCT VFR BLD CALC: 31.7 % (ref 40.5–52.5)
HCT VFR BLD CALC: 32.1 % (ref 40.5–52.5)
HCT VFR BLD CALC: 33.5 % (ref 40.5–52.5)
HCT VFR BLD CALC: 33.6 % (ref 40.5–52.5)
HCT VFR BLD CALC: 33.6 % (ref 40.5–52.5)
HEMATOLOGY PATH CONSULT: NO
HEMATOLOGY PATH CONSULT: NO
HEMATOLOGY PATH CONSULT: NORMAL
HEMATOLOGY PATH CONSULT: NORMAL
HEMATOLOGY PATH CONSULT: YES
HEMATOLOGY PATH CONSULT: YES
HEMOGLOBIN: 10 G/DL (ref 13.5–17.5)
HEMOGLOBIN: 10.5 G/DL (ref 13.5–17.5)
HEMOGLOBIN: 10.8 G/DL (ref 13.5–17.5)
HEMOGLOBIN: 10.9 G/DL (ref 13.5–17.5)
HEMOGLOBIN: 11.4 G/DL (ref 13.5–17.5)
HEMOGLOBIN: 11.4 G/DL (ref 13.5–17.5)
HEMOGLOBIN: 11.6 G/DL (ref 13.5–17.5)
HEMOGLOBIN: 7.7 G/DL (ref 13.5–17.5)
HYPOCHROMIA: ABNORMAL
INFLUENZA A: NOT DETECTED
INFLUENZA B: NOT DETECTED
INR BLD: 1.51 (ref 0.88–1.12)
KETONES, URINE: NEGATIVE MG/DL
KETONES, URINE: NEGATIVE MG/DL
LACTIC ACID: 0.9 MMOL/L (ref 0.4–2)
LACTIC ACID: 1.5 MMOL/L (ref 0.4–2)
LEUKOCYTE ESTERASE, URINE: NEGATIVE
LEUKOCYTE ESTERASE, URINE: NEGATIVE
LYMPHOCYTES ABSOLUTE: 0.5 K/UL (ref 1–5.1)
LYMPHOCYTES ABSOLUTE: 0.6 K/UL (ref 1–5.1)
LYMPHOCYTES ABSOLUTE: 0.7 K/UL (ref 1–5.1)
LYMPHOCYTES ABSOLUTE: 0.7 K/UL (ref 1–5.1)
LYMPHOCYTES RELATIVE PERCENT: 10.9 %
LYMPHOCYTES RELATIVE PERCENT: 11.7 %
LYMPHOCYTES RELATIVE PERCENT: 13.2 %
LYMPHOCYTES RELATIVE PERCENT: 13.5 %
LYMPHOCYTES RELATIVE PERCENT: 14.1 %
LYMPHOCYTES RELATIVE PERCENT: 7.2 %
LYMPHOCYTES RELATIVE PERCENT: 9.5 %
MACROCYTES: ABNORMAL
MCH RBC QN AUTO: 36.2 PG (ref 26–34)
MCH RBC QN AUTO: 36.5 PG (ref 26–34)
MCH RBC QN AUTO: 37.1 PG (ref 26–34)
MCH RBC QN AUTO: 37.5 PG (ref 26–34)
MCH RBC QN AUTO: 37.6 PG (ref 26–34)
MCH RBC QN AUTO: 37.6 PG (ref 26–34)
MCH RBC QN AUTO: 37.7 PG (ref 26–34)
MCH RBC QN AUTO: 38 PG (ref 26–34)
MCHC RBC AUTO-ENTMCNC: 33 G/DL (ref 31–36)
MCHC RBC AUTO-ENTMCNC: 34 G/DL (ref 31–36)
MCHC RBC AUTO-ENTMCNC: 34.1 G/DL (ref 31–36)
MCHC RBC AUTO-ENTMCNC: 34.4 G/DL (ref 31–36)
MCV RBC AUTO: 106.2 FL (ref 80–100)
MCV RBC AUTO: 107.2 FL (ref 80–100)
MCV RBC AUTO: 108.9 FL (ref 80–100)
MCV RBC AUTO: 109.7 FL (ref 80–100)
MCV RBC AUTO: 110.3 FL (ref 80–100)
MCV RBC AUTO: 110.5 FL (ref 80–100)
MCV RBC AUTO: 111.7 FL (ref 80–100)
MCV RBC AUTO: 113.8 FL (ref 80–100)
MICROSCOPIC EXAMINATION: NORMAL
MICROSCOPIC EXAMINATION: YES
MONOCYTES ABSOLUTE: 0.3 K/UL (ref 0–1.3)
MONOCYTES ABSOLUTE: 0.3 K/UL (ref 0–1.3)
MONOCYTES ABSOLUTE: 0.4 K/UL (ref 0–1.3)
MONOCYTES ABSOLUTE: 0.6 K/UL (ref 0–1.3)
MONOCYTES RELATIVE PERCENT: 10.4 %
MONOCYTES RELATIVE PERCENT: 11.1 %
MONOCYTES RELATIVE PERCENT: 11.1 %
MONOCYTES RELATIVE PERCENT: 11.2 %
MONOCYTES RELATIVE PERCENT: 6.6 %
MONOCYTES RELATIVE PERCENT: 8.1 %
MONOCYTES RELATIVE PERCENT: 8.9 %
NEUTROPHILS ABSOLUTE: 2.5 K/UL (ref 1.7–7.7)
NEUTROPHILS ABSOLUTE: 2.6 K/UL (ref 1.7–7.7)
NEUTROPHILS ABSOLUTE: 3.3 K/UL (ref 1.7–7.7)
NEUTROPHILS ABSOLUTE: 3.5 K/UL (ref 1.7–7.7)
NEUTROPHILS ABSOLUTE: 4.4 K/UL (ref 1.7–7.7)
NEUTROPHILS ABSOLUTE: 4.7 K/UL (ref 1.7–7.7)
NEUTROPHILS ABSOLUTE: 5.8 K/UL (ref 1.7–7.7)
NEUTROPHILS RELATIVE PERCENT: 64.5 %
NEUTROPHILS RELATIVE PERCENT: 67.2 %
NEUTROPHILS RELATIVE PERCENT: 69.6 %
NEUTROPHILS RELATIVE PERCENT: 70 %
NEUTROPHILS RELATIVE PERCENT: 77.6 %
NEUTROPHILS RELATIVE PERCENT: 78.3 %
NEUTROPHILS RELATIVE PERCENT: 83.5 %
NITRITE, URINE: NEGATIVE
NITRITE, URINE: NEGATIVE
PDW BLD-RTO: 14.6 % (ref 12.4–15.4)
PDW BLD-RTO: 15.3 % (ref 12.4–15.4)
PDW BLD-RTO: 15.4 % (ref 12.4–15.4)
PDW BLD-RTO: 15.5 % (ref 12.4–15.4)
PDW BLD-RTO: 15.6 % (ref 12.4–15.4)
PDW BLD-RTO: 15.8 % (ref 12.4–15.4)
PDW BLD-RTO: 16.1 % (ref 12.4–15.4)
PDW BLD-RTO: 16.8 % (ref 12.4–15.4)
PERFORMED ON: ABNORMAL
PH UA: 6 (ref 5–8)
PH UA: 6 (ref 5–8)
PLATELET # BLD: 127 K/UL (ref 135–450)
PLATELET # BLD: 142 K/UL (ref 135–450)
PLATELET # BLD: 149 K/UL (ref 135–450)
PLATELET # BLD: 153 K/UL (ref 135–450)
PLATELET # BLD: 162 K/UL (ref 135–450)
PLATELET # BLD: 165 K/UL (ref 135–450)
PLATELET # BLD: 178 K/UL (ref 135–450)
PLATELET # BLD: 186 K/UL (ref 135–450)
PLATELET SLIDE REVIEW: ADEQUATE
PMV BLD AUTO: 7.4 FL (ref 5–10.5)
PMV BLD AUTO: 7.4 FL (ref 5–10.5)
PMV BLD AUTO: 7.6 FL (ref 5–10.5)
PMV BLD AUTO: 7.6 FL (ref 5–10.5)
PMV BLD AUTO: 7.7 FL (ref 5–10.5)
PMV BLD AUTO: 7.9 FL (ref 5–10.5)
PMV BLD AUTO: 8 FL (ref 5–10.5)
PMV BLD AUTO: 8.3 FL (ref 5–10.5)
POIKILOCYTES: ABNORMAL
POLYCHROMASIA: ABNORMAL
POTASSIUM REFLEX MAGNESIUM: 4.4 MMOL/L (ref 3.5–5.1)
POTASSIUM REFLEX MAGNESIUM: 4.5 MMOL/L (ref 3.5–5.1)
POTASSIUM REFLEX MAGNESIUM: 4.7 MMOL/L (ref 3.5–5.1)
POTASSIUM REFLEX MAGNESIUM: 5.2 MMOL/L (ref 3.5–5.1)
POTASSIUM SERPL-SCNC: 4.5 MMOL/L (ref 3.5–5.1)
POTASSIUM SERPL-SCNC: 4.7 MMOL/L (ref 3.5–5.1)
POTASSIUM SERPL-SCNC: 5 MMOL/L (ref 3.5–5.1)
PRO-BNP: 1541 PG/ML (ref 0–449)
PROCALCITONIN: 0.07 NG/ML (ref 0–0.15)
PROCALCITONIN: 0.07 NG/ML (ref 0–0.15)
PROCALCITONIN: 0.12 NG/ML (ref 0–0.15)
PROTEIN UA: NEGATIVE MG/DL
PROTEIN UA: NEGATIVE MG/DL
PROTHROMBIN TIME: 17.4 SEC (ref 9.9–12.7)
RBC # BLD: 2.04 M/UL (ref 4.2–5.9)
RBC # BLD: 2.65 M/UL (ref 4.2–5.9)
RBC # BLD: 2.82 M/UL (ref 4.2–5.9)
RBC # BLD: 2.87 M/UL (ref 4.2–5.9)
RBC # BLD: 2.88 M/UL (ref 4.2–5.9)
RBC # BLD: 3.06 M/UL (ref 4.2–5.9)
RBC # BLD: 3.12 M/UL (ref 4.2–5.9)
RBC # BLD: 3.16 M/UL (ref 4.2–5.9)
RBC UA: ABNORMAL /HPF (ref 0–4)
SARS-COV-2 RNA, RT PCR: NOT DETECTED
SARS-COV-2, NAAT: NOT DETECTED
SARS-COV-2: NOT DETECTED
SLIDE REVIEW: ABNORMAL
SLIDE REVIEW: ABNORMAL
SODIUM BLD-SCNC: 132 MMOL/L (ref 136–145)
SODIUM BLD-SCNC: 133 MMOL/L (ref 136–145)
SODIUM BLD-SCNC: 134 MMOL/L (ref 136–145)
SODIUM BLD-SCNC: 135 MMOL/L (ref 136–145)
SODIUM BLD-SCNC: 136 MMOL/L (ref 136–145)
SODIUM BLD-SCNC: 138 MMOL/L (ref 136–145)
SODIUM BLD-SCNC: 138 MMOL/L (ref 136–145)
SPECIFIC GRAVITY UA: 1.02 (ref 1–1.03)
SPECIFIC GRAVITY UA: 1.02 (ref 1–1.03)
SPECIMEN STATUS: NORMAL
TOTAL PROTEIN: 6.3 G/DL (ref 6.4–8.2)
TOTAL PROTEIN: 6.3 G/DL (ref 6.4–8.2)
TOTAL PROTEIN: 6.6 G/DL (ref 6.4–8.2)
TOTAL PROTEIN: 7.2 G/DL (ref 6.4–8.2)
TROPONIN: <0.01 NG/ML
URINE REFLEX TO CULTURE: ABNORMAL
URINE TYPE: ABNORMAL
URINE TYPE: NORMAL
UROBILINOGEN, URINE: 0.2 E.U./DL
UROBILINOGEN, URINE: 1 E.U./DL
VITAMIN B-12: 577 PG/ML (ref 211–911)
WBC # BLD: 3.8 K/UL (ref 4–11)
WBC # BLD: 3.9 K/UL (ref 4–11)
WBC # BLD: 4.4 K/UL (ref 4–11)
WBC # BLD: 4.8 K/UL (ref 4–11)
WBC # BLD: 5.1 K/UL (ref 4–11)
WBC # BLD: 5.6 K/UL (ref 4–11)
WBC # BLD: 6 K/UL (ref 4–11)
WBC # BLD: 7 K/UL (ref 4–11)
WBC UA: ABNORMAL /HPF (ref 0–5)

## 2021-01-01 PROCEDURE — 99285 EMERGENCY DEPT VISIT HI MDM: CPT

## 2021-01-01 PROCEDURE — U0005 INFEC AGEN DETEC AMPLI PROBE: HCPCS

## 2021-01-01 PROCEDURE — 93010 ELECTROCARDIOGRAM REPORT: CPT | Performed by: INTERNAL MEDICINE

## 2021-01-01 PROCEDURE — 97530 THERAPEUTIC ACTIVITIES: CPT

## 2021-01-01 PROCEDURE — 6360000002 HC RX W HCPCS: Performed by: NURSE PRACTITIONER

## 2021-01-01 PROCEDURE — 6360000002 HC RX W HCPCS: Performed by: PHYSICIAN ASSISTANT

## 2021-01-01 PROCEDURE — 3600000014 HC SURGERY LEVEL 4 ADDTL 15MIN: Performed by: ORTHOPAEDIC SURGERY

## 2021-01-01 PROCEDURE — 99222 1ST HOSP IP/OBS MODERATE 55: CPT | Performed by: ORTHOPAEDIC SURGERY

## 2021-01-01 PROCEDURE — 27245 TREAT THIGH FRACTURE: CPT | Performed by: ORTHOPAEDIC SURGERY

## 2021-01-01 PROCEDURE — 2500000003 HC RX 250 WO HCPCS: Performed by: ANESTHESIOLOGY

## 2021-01-01 PROCEDURE — 62304 MYELOGRAPHY LUMBAR INJECTION: CPT

## 2021-01-01 PROCEDURE — 96365 THER/PROPH/DIAG IV INF INIT: CPT

## 2021-01-01 PROCEDURE — 6370000000 HC RX 637 (ALT 250 FOR IP): Performed by: ORTHOPAEDIC SURGERY

## 2021-01-01 PROCEDURE — 6370000000 HC RX 637 (ALT 250 FOR IP): Performed by: SPECIALIST/TECHNOLOGIST

## 2021-01-01 PROCEDURE — 80048 BASIC METABOLIC PNL TOTAL CA: CPT

## 2021-01-01 PROCEDURE — 96372 THER/PROPH/DIAG INJ SC/IM: CPT

## 2021-01-01 PROCEDURE — 85027 COMPLETE CBC AUTOMATED: CPT

## 2021-01-01 PROCEDURE — 2580000003 HC RX 258: Performed by: PHYSICIAN ASSISTANT

## 2021-01-01 PROCEDURE — 3700000001 HC ADD 15 MINUTES (ANESTHESIA): Performed by: ORTHOPAEDIC SURGERY

## 2021-01-01 PROCEDURE — 82607 VITAMIN B-12: CPT

## 2021-01-01 PROCEDURE — 3209999900 FLUORO FOR SURGICAL PROCEDURES

## 2021-01-01 PROCEDURE — 2709999900 HC NON-CHARGEABLE SUPPLY: Performed by: ORTHOPAEDIC SURGERY

## 2021-01-01 PROCEDURE — 6360000002 HC RX W HCPCS: Performed by: ANESTHESIOLOGY

## 2021-01-01 PROCEDURE — 85025 COMPLETE CBC W/AUTO DIFF WBC: CPT

## 2021-01-01 PROCEDURE — 1200000000 HC SEMI PRIVATE

## 2021-01-01 PROCEDURE — 6370000000 HC RX 637 (ALT 250 FOR IP): Performed by: NURSE PRACTITIONER

## 2021-01-01 PROCEDURE — 36415 COLL VENOUS BLD VENIPUNCTURE: CPT

## 2021-01-01 PROCEDURE — 80053 COMPREHEN METABOLIC PANEL: CPT

## 2021-01-01 PROCEDURE — 83605 ASSAY OF LACTIC ACID: CPT

## 2021-01-01 PROCEDURE — 93005 ELECTROCARDIOGRAM TRACING: CPT | Performed by: PHYSICIAN ASSISTANT

## 2021-01-01 PROCEDURE — U0003 INFECTIOUS AGENT DETECTION BY NUCLEIC ACID (DNA OR RNA); SEVERE ACUTE RESPIRATORY SYNDROME CORONAVIRUS 2 (SARS-COV-2) (CORONAVIRUS DISEASE [COVID-19]), AMPLIFIED PROBE TECHNIQUE, MAKING USE OF HIGH THROUGHPUT TECHNOLOGIES AS DESCRIBED BY CMS-2020-01-R: HCPCS

## 2021-01-01 PROCEDURE — 96366 THER/PROPH/DIAG IV INF ADDON: CPT

## 2021-01-01 PROCEDURE — G0378 HOSPITAL OBSERVATION PER HR: HCPCS

## 2021-01-01 PROCEDURE — 87040 BLOOD CULTURE FOR BACTERIA: CPT

## 2021-01-01 PROCEDURE — 97161 PT EVAL LOW COMPLEX 20 MIN: CPT

## 2021-01-01 PROCEDURE — 6360000004 HC RX CONTRAST MEDICATION: Performed by: RADIOLOGY

## 2021-01-01 PROCEDURE — 73700 CT LOWER EXTREMITY W/O DYE: CPT

## 2021-01-01 PROCEDURE — 7100000000 HC PACU RECOVERY - FIRST 15 MIN: Performed by: ORTHOPAEDIC SURGERY

## 2021-01-01 PROCEDURE — 97162 PT EVAL MOD COMPLEX 30 MIN: CPT

## 2021-01-01 PROCEDURE — 71045 X-RAY EXAM CHEST 1 VIEW: CPT

## 2021-01-01 PROCEDURE — 85610 PROTHROMBIN TIME: CPT

## 2021-01-01 PROCEDURE — 84484 ASSAY OF TROPONIN QUANT: CPT

## 2021-01-01 PROCEDURE — 3600000004 HC SURGERY LEVEL 4 BASE: Performed by: ORTHOPAEDIC SURGERY

## 2021-01-01 PROCEDURE — 2580000003 HC RX 258: Performed by: STUDENT IN AN ORGANIZED HEALTH CARE EDUCATION/TRAINING PROGRAM

## 2021-01-01 PROCEDURE — 6360000002 HC RX W HCPCS: Performed by: ORTHOPAEDIC SURGERY

## 2021-01-01 PROCEDURE — 51702 INSERT TEMP BLADDER CATH: CPT

## 2021-01-01 PROCEDURE — 6360000002 HC RX W HCPCS: Performed by: STUDENT IN AN ORGANIZED HEALTH CARE EDUCATION/TRAINING PROGRAM

## 2021-01-01 PROCEDURE — 7100000001 HC PACU RECOVERY - ADDTL 15 MIN: Performed by: ORTHOPAEDIC SURGERY

## 2021-01-01 PROCEDURE — 2580000003 HC RX 258: Performed by: NURSE PRACTITIONER

## 2021-01-01 PROCEDURE — 73552 X-RAY EXAM OF FEMUR 2/>: CPT

## 2021-01-01 PROCEDURE — 6370000000 HC RX 637 (ALT 250 FOR IP): Performed by: PHYSICIAN ASSISTANT

## 2021-01-01 PROCEDURE — 87636 SARSCOV2 & INF A&B AMP PRB: CPT

## 2021-01-01 PROCEDURE — 7100000011 HC PHASE II RECOVERY - ADDTL 15 MIN

## 2021-01-01 PROCEDURE — 84145 PROCALCITONIN (PCT): CPT

## 2021-01-01 PROCEDURE — 97165 OT EVAL LOW COMPLEX 30 MIN: CPT

## 2021-01-01 PROCEDURE — 97535 SELF CARE MNGMENT TRAINING: CPT

## 2021-01-01 PROCEDURE — C1769 GUIDE WIRE: HCPCS | Performed by: ORTHOPAEDIC SURGERY

## 2021-01-01 PROCEDURE — 70450 CT HEAD/BRAIN W/O DYE: CPT

## 2021-01-01 PROCEDURE — 72129 CT CHEST SPINE W/DYE: CPT

## 2021-01-01 PROCEDURE — 97166 OT EVAL MOD COMPLEX 45 MIN: CPT

## 2021-01-01 PROCEDURE — 97116 GAIT TRAINING THERAPY: CPT

## 2021-01-01 PROCEDURE — 3700000000 HC ANESTHESIA ATTENDED CARE: Performed by: ORTHOPAEDIC SURGERY

## 2021-01-01 PROCEDURE — 2720000010 HC SURG SUPPLY STERILE: Performed by: ORTHOPAEDIC SURGERY

## 2021-01-01 PROCEDURE — 99233 SBSQ HOSP IP/OBS HIGH 50: CPT | Performed by: INTERNAL MEDICINE

## 2021-01-01 PROCEDURE — 99284 EMERGENCY DEPT VISIT MOD MDM: CPT

## 2021-01-01 PROCEDURE — 99222 1ST HOSP IP/OBS MODERATE 55: CPT | Performed by: INTERNAL MEDICINE

## 2021-01-01 PROCEDURE — 97110 THERAPEUTIC EXERCISES: CPT

## 2021-01-01 PROCEDURE — 82746 ASSAY OF FOLIC ACID SERUM: CPT

## 2021-01-01 PROCEDURE — 93005 ELECTROCARDIOGRAM TRACING: CPT | Performed by: EMERGENCY MEDICINE

## 2021-01-01 PROCEDURE — 86850 RBC ANTIBODY SCREEN: CPT

## 2021-01-01 PROCEDURE — 86901 BLOOD TYPING SEROLOGIC RH(D): CPT

## 2021-01-01 PROCEDURE — 2500000003 HC RX 250 WO HCPCS: Performed by: STUDENT IN AN ORGANIZED HEALTH CARE EDUCATION/TRAINING PROGRAM

## 2021-01-01 PROCEDURE — C1713 ANCHOR/SCREW BN/BN,TIS/BN: HCPCS | Performed by: ORTHOPAEDIC SURGERY

## 2021-01-01 PROCEDURE — 72132 CT LUMBAR SPINE W/DYE: CPT

## 2021-01-01 PROCEDURE — 2580000003 HC RX 258: Performed by: ORTHOPAEDIC SURGERY

## 2021-01-01 PROCEDURE — 87635 SARS-COV-2 COVID-19 AMP PRB: CPT

## 2021-01-01 PROCEDURE — 96367 TX/PROPH/DG ADDL SEQ IV INF: CPT

## 2021-01-01 PROCEDURE — 7100000010 HC PHASE II RECOVERY - FIRST 15 MIN

## 2021-01-01 PROCEDURE — 81003 URINALYSIS AUTO W/O SCOPE: CPT

## 2021-01-01 PROCEDURE — 51798 US URINE CAPACITY MEASURE: CPT

## 2021-01-01 PROCEDURE — 93005 ELECTROCARDIOGRAM TRACING: CPT | Performed by: INTERNAL MEDICINE

## 2021-01-01 PROCEDURE — 86900 BLOOD TYPING SEROLOGIC ABO: CPT

## 2021-01-01 PROCEDURE — 99221 1ST HOSP IP/OBS SF/LOW 40: CPT | Performed by: NURSE PRACTITIONER

## 2021-01-01 PROCEDURE — 6360000002 HC RX W HCPCS: Performed by: SPECIALIST/TECHNOLOGIST

## 2021-01-01 PROCEDURE — 74176 CT ABD & PELVIS W/O CONTRAST: CPT

## 2021-01-01 PROCEDURE — 83880 ASSAY OF NATRIURETIC PEPTIDE: CPT

## 2021-01-01 PROCEDURE — 81001 URINALYSIS AUTO W/SCOPE: CPT

## 2021-01-01 PROCEDURE — 93005 ELECTROCARDIOGRAM TRACING: CPT | Performed by: STUDENT IN AN ORGANIZED HEALTH CARE EDUCATION/TRAINING PROGRAM

## 2021-01-01 PROCEDURE — 96374 THER/PROPH/DIAG INJ IV PUSH: CPT

## 2021-01-01 PROCEDURE — 0QS604Z REPOSITION RIGHT UPPER FEMUR WITH INTERNAL FIXATION DEVICE, OPEN APPROACH: ICD-10-PCS | Performed by: ORTHOPAEDIC SURGERY

## 2021-01-01 PROCEDURE — 2580000003 HC RX 258: Performed by: INTERNAL MEDICINE

## 2021-01-01 PROCEDURE — 72170 X-RAY EXAM OF PELVIS: CPT

## 2021-01-01 DEVICE — LOCKING SCREW
Type: IMPLANTABLE DEVICE | Site: HIP | Status: FUNCTIONAL
Brand: T2 ALPHA

## 2021-01-01 DEVICE — U-BLADE SET, TI
Type: IMPLANTABLE DEVICE | Site: HIP | Status: FUNCTIONAL
Brand: GAMMA

## 2021-01-01 DEVICE — LONG NAIL KIT R1.5, TI, RIGHT
Type: IMPLANTABLE DEVICE | Site: HIP | Status: FUNCTIONAL
Brand: GAMMA

## 2021-01-01 RX ORDER — EPINEPHRINE 1 MG/ML
INJECTION, SOLUTION, CONCENTRATE INTRAVENOUS
Status: COMPLETED | OUTPATIENT
Start: 2021-01-01 | End: 2021-01-01

## 2021-01-01 RX ORDER — MEPERIDINE HYDROCHLORIDE 25 MG/ML
12.5 INJECTION INTRAMUSCULAR; INTRAVENOUS; SUBCUTANEOUS EVERY 5 MIN PRN
Status: DISCONTINUED | OUTPATIENT
Start: 2021-01-01 | End: 2021-01-01

## 2021-01-01 RX ORDER — SODIUM CHLORIDE 0.9 % (FLUSH) 0.9 %
5-40 SYRINGE (ML) INJECTION PRN
Status: DISCONTINUED | OUTPATIENT
Start: 2021-01-01 | End: 2021-01-01 | Stop reason: SDUPTHER

## 2021-01-01 RX ORDER — ACETAMINOPHEN 325 MG/1
650 TABLET ORAL ONCE
Status: COMPLETED | OUTPATIENT
Start: 2021-01-01 | End: 2021-01-01

## 2021-01-01 RX ORDER — LACTOBACILLUS RHAMNOSUS GG 10B CELL
1 CAPSULE ORAL
Status: DISCONTINUED | OUTPATIENT
Start: 2021-01-01 | End: 2021-01-01 | Stop reason: HOSPADM

## 2021-01-01 RX ORDER — ACETAMINOPHEN 325 MG/1
650 TABLET ORAL EVERY 6 HOURS PRN
Status: DISCONTINUED | OUTPATIENT
Start: 2021-01-01 | End: 2021-01-01 | Stop reason: HOSPADM

## 2021-01-01 RX ORDER — ACETAMINOPHEN 650 MG/1
650 SUPPOSITORY RECTAL EVERY 6 HOURS PRN
Status: DISCONTINUED | OUTPATIENT
Start: 2021-01-01 | End: 2021-11-16 | Stop reason: HOSPADM

## 2021-01-01 RX ORDER — OXYCODONE HYDROCHLORIDE AND ACETAMINOPHEN 5; 325 MG/1; MG/1
1 TABLET ORAL PRN
Status: DISCONTINUED | OUTPATIENT
Start: 2021-01-01 | End: 2021-01-01

## 2021-01-01 RX ORDER — SODIUM CHLORIDE 9 MG/ML
25 INJECTION, SOLUTION INTRAVENOUS PRN
Status: DISCONTINUED | OUTPATIENT
Start: 2021-01-01 | End: 2021-01-01 | Stop reason: HOSPADM

## 2021-01-01 RX ORDER — OXYCODONE HYDROCHLORIDE AND ACETAMINOPHEN 5; 325 MG/1; MG/1
2 TABLET ORAL PRN
Status: DISCONTINUED | OUTPATIENT
Start: 2021-01-01 | End: 2021-01-01

## 2021-01-01 RX ORDER — ATORVASTATIN CALCIUM 40 MG/1
40 TABLET, FILM COATED ORAL NIGHTLY
Status: DISCONTINUED | OUTPATIENT
Start: 2021-01-01 | End: 2021-11-16 | Stop reason: HOSPADM

## 2021-01-01 RX ORDER — POLYETHYLENE GLYCOL 3350 17 G/17G
17 POWDER, FOR SOLUTION ORAL DAILY PRN
Status: CANCELLED | OUTPATIENT
Start: 2021-01-01

## 2021-01-01 RX ORDER — SODIUM CHLORIDE 9 MG/ML
25 INJECTION, SOLUTION INTRAVENOUS PRN
Status: DISCONTINUED | OUTPATIENT
Start: 2021-01-01 | End: 2021-11-16 | Stop reason: HOSPADM

## 2021-01-01 RX ORDER — CEFDINIR 300 MG/1
300 CAPSULE ORAL 2 TIMES DAILY
Qty: 10 CAPSULE | Refills: 0 | Status: SHIPPED | OUTPATIENT
Start: 2021-01-01 | End: 2021-01-01

## 2021-01-01 RX ORDER — MORPHINE SULFATE 4 MG/ML
4 INJECTION, SOLUTION INTRAMUSCULAR; INTRAVENOUS EVERY 4 HOURS PRN
Status: DISCONTINUED | OUTPATIENT
Start: 2021-01-01 | End: 2021-01-01

## 2021-01-01 RX ORDER — PROPOFOL 10 MG/ML
INJECTION, EMULSION INTRAVENOUS PRN
Status: DISCONTINUED | OUTPATIENT
Start: 2021-01-01 | End: 2021-01-01 | Stop reason: SDUPTHER

## 2021-01-01 RX ORDER — ONDANSETRON 4 MG/1
4 TABLET, ORALLY DISINTEGRATING ORAL EVERY 8 HOURS PRN
Status: CANCELLED | OUTPATIENT
Start: 2021-01-01

## 2021-01-01 RX ORDER — MORPHINE SULFATE 2 MG/ML
2 INJECTION, SOLUTION INTRAMUSCULAR; INTRAVENOUS EVERY 4 HOURS PRN
Status: DISCONTINUED | OUTPATIENT
Start: 2021-01-01 | End: 2021-11-16 | Stop reason: HOSPADM

## 2021-01-01 RX ORDER — OXYCODONE HYDROCHLORIDE 5 MG/1
5 TABLET ORAL EVERY 4 HOURS PRN
Status: DISCONTINUED | OUTPATIENT
Start: 2021-01-01 | End: 2021-11-16 | Stop reason: HOSPADM

## 2021-01-01 RX ORDER — ONDANSETRON 2 MG/ML
4 INJECTION INTRAMUSCULAR; INTRAVENOUS EVERY 6 HOURS PRN
Status: DISCONTINUED | OUTPATIENT
Start: 2021-01-01 | End: 2021-11-16 | Stop reason: HOSPADM

## 2021-01-01 RX ORDER — HYDRALAZINE HYDROCHLORIDE 20 MG/ML
5 INJECTION INTRAMUSCULAR; INTRAVENOUS EVERY 10 MIN PRN
Status: DISCONTINUED | OUTPATIENT
Start: 2021-01-01 | End: 2021-01-01

## 2021-01-01 RX ORDER — POLYETHYLENE GLYCOL 3350 17 G/17G
17 POWDER, FOR SOLUTION ORAL DAILY PRN
Status: DISCONTINUED | OUTPATIENT
Start: 2021-01-01 | End: 2021-11-16 | Stop reason: HOSPADM

## 2021-01-01 RX ORDER — DOCUSATE SODIUM 100 MG/1
100 CAPSULE, LIQUID FILLED ORAL 2 TIMES DAILY
Status: DISCONTINUED | OUTPATIENT
Start: 2021-01-01 | End: 2021-11-16 | Stop reason: HOSPADM

## 2021-01-01 RX ORDER — SODIUM CHLORIDE 0.9 % (FLUSH) 0.9 %
5-40 SYRINGE (ML) INJECTION EVERY 12 HOURS SCHEDULED
Status: DISCONTINUED | OUTPATIENT
Start: 2021-01-01 | End: 2021-01-01 | Stop reason: SDUPTHER

## 2021-01-01 RX ORDER — ACETAMINOPHEN 325 MG/1
650 TABLET ORAL EVERY 6 HOURS PRN
Status: DISCONTINUED | OUTPATIENT
Start: 2021-01-01 | End: 2021-11-16 | Stop reason: HOSPADM

## 2021-01-01 RX ORDER — ACETAMINOPHEN 650 MG/1
650 SUPPOSITORY RECTAL EVERY 6 HOURS PRN
Status: DISCONTINUED | OUTPATIENT
Start: 2021-01-01 | End: 2021-01-01 | Stop reason: HOSPADM

## 2021-01-01 RX ORDER — LACTOBACILLUS RHAMNOSUS GG 10B CELL
1 CAPSULE ORAL
Qty: 30 CAPSULE | Refills: 0 | Status: SHIPPED | OUTPATIENT
Start: 2021-01-01 | End: 2021-01-01

## 2021-01-01 RX ORDER — ONDANSETRON 2 MG/ML
4 INJECTION INTRAMUSCULAR; INTRAVENOUS EVERY 6 HOURS PRN
Status: DISCONTINUED | OUTPATIENT
Start: 2021-01-01 | End: 2021-01-01 | Stop reason: HOSPADM

## 2021-01-01 RX ORDER — SODIUM CHLORIDE 0.9 % (FLUSH) 0.9 %
10 SYRINGE (ML) INJECTION EVERY 12 HOURS SCHEDULED
Status: DISCONTINUED | OUTPATIENT
Start: 2021-01-01 | End: 2021-01-01 | Stop reason: HOSPADM

## 2021-01-01 RX ORDER — LABETALOL HYDROCHLORIDE 5 MG/ML
5 INJECTION, SOLUTION INTRAVENOUS EVERY 10 MIN PRN
Status: DISCONTINUED | OUTPATIENT
Start: 2021-01-01 | End: 2021-01-01

## 2021-01-01 RX ORDER — ONDANSETRON 2 MG/ML
4 INJECTION INTRAMUSCULAR; INTRAVENOUS EVERY 10 MIN PRN
Status: DISCONTINUED | OUTPATIENT
Start: 2021-01-01 | End: 2021-01-01

## 2021-01-01 RX ORDER — SODIUM CHLORIDE 0.9 % (FLUSH) 0.9 %
10 SYRINGE (ML) INJECTION PRN
Status: DISCONTINUED | OUTPATIENT
Start: 2021-01-01 | End: 2021-01-01 | Stop reason: HOSPADM

## 2021-01-01 RX ORDER — SODIUM CHLORIDE 0.9 % (FLUSH) 0.9 %
5-40 SYRINGE (ML) INJECTION PRN
Status: DISCONTINUED | OUTPATIENT
Start: 2021-01-01 | End: 2021-11-16 | Stop reason: HOSPADM

## 2021-01-01 RX ORDER — MECOBALAMIN 5000 MCG
5 TABLET,DISINTEGRATING ORAL NIGHTLY
Status: DISCONTINUED | OUTPATIENT
Start: 2021-01-01 | End: 2021-01-01 | Stop reason: HOSPADM

## 2021-01-01 RX ORDER — POLYETHYLENE GLYCOL 3350 17 G/17G
17 POWDER, FOR SOLUTION ORAL DAILY PRN
Status: DISCONTINUED | OUTPATIENT
Start: 2021-01-01 | End: 2021-01-01 | Stop reason: HOSPADM

## 2021-01-01 RX ORDER — SODIUM CHLORIDE 0.9 % (FLUSH) 0.9 %
5-40 SYRINGE (ML) INJECTION EVERY 12 HOURS SCHEDULED
Status: DISCONTINUED | OUTPATIENT
Start: 2021-01-01 | End: 2021-11-16 | Stop reason: HOSPADM

## 2021-01-01 RX ORDER — ACETAMINOPHEN 325 MG/1
650 TABLET ORAL EVERY 6 HOURS PRN
Status: DISCONTINUED | OUTPATIENT
Start: 2021-01-01 | End: 2021-01-01 | Stop reason: SDUPTHER

## 2021-01-01 RX ORDER — SODIUM CHLORIDE 9 MG/ML
INJECTION, SOLUTION INTRAVENOUS ONCE
Status: COMPLETED | OUTPATIENT
Start: 2021-01-01 | End: 2021-01-01

## 2021-01-01 RX ORDER — AMIODARONE HYDROCHLORIDE 50 MG/ML
INJECTION, SOLUTION INTRAVENOUS
Status: COMPLETED | OUTPATIENT
Start: 2021-01-01 | End: 2021-01-01

## 2021-01-01 RX ORDER — EPHEDRINE SULFATE 50 MG/ML
INJECTION, SOLUTION INTRAVENOUS PRN
Status: DISCONTINUED | OUTPATIENT
Start: 2021-01-01 | End: 2021-01-01 | Stop reason: SDUPTHER

## 2021-01-01 RX ORDER — 0.9 % SODIUM CHLORIDE 0.9 %
1000 INTRAVENOUS SOLUTION INTRAVENOUS ONCE
Status: COMPLETED | OUTPATIENT
Start: 2021-01-01 | End: 2021-01-01

## 2021-01-01 RX ORDER — ONDANSETRON 2 MG/ML
INJECTION INTRAMUSCULAR; INTRAVENOUS PRN
Status: DISCONTINUED | OUTPATIENT
Start: 2021-01-01 | End: 2021-01-01 | Stop reason: SDUPTHER

## 2021-01-01 RX ORDER — OXYCODONE HYDROCHLORIDE 5 MG/1
10 TABLET ORAL EVERY 4 HOURS PRN
Status: DISCONTINUED | OUTPATIENT
Start: 2021-01-01 | End: 2021-11-16 | Stop reason: HOSPADM

## 2021-01-01 RX ORDER — IPRATROPIUM BROMIDE AND ALBUTEROL SULFATE 2.5; .5 MG/3ML; MG/3ML
1 SOLUTION RESPIRATORY (INHALATION) EVERY 4 HOURS PRN
Status: DISCONTINUED | OUTPATIENT
Start: 2021-01-01 | End: 2021-01-01 | Stop reason: HOSPADM

## 2021-01-01 RX ORDER — ONDANSETRON 2 MG/ML
4 INJECTION INTRAMUSCULAR; INTRAVENOUS EVERY 6 HOURS PRN
Status: CANCELLED | OUTPATIENT
Start: 2021-01-01

## 2021-01-01 RX ORDER — ONDANSETRON 4 MG/1
4 TABLET, ORALLY DISINTEGRATING ORAL EVERY 8 HOURS PRN
Status: DISCONTINUED | OUTPATIENT
Start: 2021-01-01 | End: 2021-11-16 | Stop reason: HOSPADM

## 2021-01-01 RX ORDER — SODIUM CHLORIDE 9 MG/ML
INJECTION, SOLUTION INTRAVENOUS CONTINUOUS
Status: DISCONTINUED | OUTPATIENT
Start: 2021-01-01 | End: 2021-01-01 | Stop reason: ALTCHOICE

## 2021-01-01 RX ORDER — ACETAMINOPHEN 650 MG/1
650 SUPPOSITORY RECTAL EVERY 6 HOURS PRN
Status: DISCONTINUED | OUTPATIENT
Start: 2021-01-01 | End: 2021-01-01 | Stop reason: SDUPTHER

## 2021-01-01 RX ORDER — ATORVASTATIN CALCIUM 40 MG/1
40 TABLET, FILM COATED ORAL NIGHTLY
Status: DISCONTINUED | OUTPATIENT
Start: 2021-01-01 | End: 2021-01-01 | Stop reason: HOSPADM

## 2021-01-01 RX ORDER — ASPIRIN 81 MG/1
81 TABLET, CHEWABLE ORAL DAILY
Status: DISCONTINUED | OUTPATIENT
Start: 2021-01-01 | End: 2021-01-01 | Stop reason: HOSPADM

## 2021-01-01 RX ORDER — ACETAMINOPHEN 325 MG/1
650 TABLET ORAL EVERY 4 HOURS PRN
Status: DISCONTINUED | OUTPATIENT
Start: 2021-01-01 | End: 2021-01-01 | Stop reason: HOSPADM

## 2021-01-01 RX ORDER — SODIUM CHLORIDE 9 MG/ML
25 INJECTION, SOLUTION INTRAVENOUS PRN
Status: DISCONTINUED | OUTPATIENT
Start: 2021-01-01 | End: 2021-01-01 | Stop reason: SDUPTHER

## 2021-01-01 RX ORDER — SODIUM CHLORIDE 450 MG/100ML
INJECTION, SOLUTION INTRAVENOUS CONTINUOUS
Status: DISCONTINUED | OUTPATIENT
Start: 2021-01-01 | End: 2021-11-16 | Stop reason: HOSPADM

## 2021-01-01 RX ORDER — 0.9 % SODIUM CHLORIDE 0.9 %
500 INTRAVENOUS SOLUTION INTRAVENOUS ONCE
Status: COMPLETED | OUTPATIENT
Start: 2021-01-01 | End: 2021-01-01

## 2021-01-01 RX ORDER — PROMETHAZINE HYDROCHLORIDE 25 MG/1
12.5 TABLET ORAL EVERY 6 HOURS PRN
Status: DISCONTINUED | OUTPATIENT
Start: 2021-01-01 | End: 2021-01-01 | Stop reason: HOSPADM

## 2021-01-01 RX ORDER — ASPIRIN 81 MG/1
81 TABLET, CHEWABLE ORAL DAILY
Status: DISCONTINUED | OUTPATIENT
Start: 2021-01-01 | End: 2021-11-16 | Stop reason: HOSPADM

## 2021-01-01 RX ORDER — DOCUSATE SODIUM 100 MG/1
100 CAPSULE, LIQUID FILLED ORAL 2 TIMES DAILY
Qty: 30 CAPSULE | Refills: 0 | Status: SHIPPED | OUTPATIENT
Start: 2021-01-01

## 2021-01-01 RX ADMIN — Medication 2000 MG: at 03:27

## 2021-01-01 RX ADMIN — DOCUSATE SODIUM 100 MG: 100 CAPSULE ORAL at 20:29

## 2021-01-01 RX ADMIN — SODIUM CHLORIDE: 9 INJECTION, SOLUTION INTRAVENOUS at 08:34

## 2021-01-01 RX ADMIN — EPHEDRINE SULFATE 5 MG: 50 INJECTION INTRAMUSCULAR; INTRAVENOUS; SUBCUTANEOUS at 13:08

## 2021-01-01 RX ADMIN — ASPIRIN 81 MG: 81 TABLET, CHEWABLE ORAL at 08:34

## 2021-01-01 RX ADMIN — OXYCODONE HYDROCHLORIDE 5 MG: 5 TABLET ORAL at 09:49

## 2021-01-01 RX ADMIN — PHENYLEPHRINE HYDROCHLORIDE 100 MCG: 10 INJECTION INTRAVENOUS at 12:58

## 2021-01-01 RX ADMIN — EPHEDRINE SULFATE 5 MG: 50 INJECTION INTRAMUSCULAR; INTRAVENOUS; SUBCUTANEOUS at 13:05

## 2021-01-01 RX ADMIN — ASPIRIN 81 MG: 81 TABLET, CHEWABLE ORAL at 07:44

## 2021-01-01 RX ADMIN — EPINEPHRINE 1 MCG: 1 INJECTION, SOLUTION, CONCENTRATE INTRAVENOUS at 17:50

## 2021-01-01 RX ADMIN — ENOXAPARIN SODIUM 40 MG: 40 INJECTION SUBCUTANEOUS at 07:43

## 2021-01-01 RX ADMIN — EPHEDRINE SULFATE 5 MG: 50 INJECTION INTRAMUSCULAR; INTRAVENOUS; SUBCUTANEOUS at 13:02

## 2021-01-01 RX ADMIN — Medication 10 ML: at 20:58

## 2021-01-01 RX ADMIN — LABETALOL HYDROCHLORIDE 300 MG: 200 TABLET, FILM COATED ORAL at 01:39

## 2021-01-01 RX ADMIN — Medication 2000 MG: at 12:35

## 2021-01-01 RX ADMIN — PHENYLEPHRINE HYDROCHLORIDE 100 MCG: 10 INJECTION INTRAVENOUS at 13:29

## 2021-01-01 RX ADMIN — CEFTRIAXONE SODIUM 1000 MG: 1 INJECTION, POWDER, FOR SOLUTION INTRAMUSCULAR; INTRAVENOUS at 10:51

## 2021-01-01 RX ADMIN — DOCUSATE SODIUM 100 MG: 100 CAPSULE ORAL at 09:49

## 2021-01-01 RX ADMIN — LABETALOL HYDROCHLORIDE 300 MG: 200 TABLET, FILM COATED ORAL at 10:38

## 2021-01-01 RX ADMIN — SODIUM CHLORIDE: 4.5 INJECTION, SOLUTION INTRAVENOUS at 08:30

## 2021-01-01 RX ADMIN — SODIUM CHLORIDE: 4.5 INJECTION, SOLUTION INTRAVENOUS at 17:56

## 2021-01-01 RX ADMIN — PROPOFOL 80 MG: 10 INJECTION, EMULSION INTRAVENOUS at 12:43

## 2021-01-01 RX ADMIN — SODIUM CHLORIDE 1000 ML: 9 INJECTION, SOLUTION INTRAVENOUS at 20:11

## 2021-01-01 RX ADMIN — LABETALOL HYDROCHLORIDE 300 MG: 200 TABLET, FILM COATED ORAL at 21:36

## 2021-01-01 RX ADMIN — MORPHINE SULFATE 4 MG: 4 INJECTION INTRAVENOUS at 06:11

## 2021-01-01 RX ADMIN — SODIUM CHLORIDE 1000 ML: 9 INJECTION, SOLUTION INTRAVENOUS at 06:34

## 2021-01-01 RX ADMIN — ONDANSETRON HYDROCHLORIDE 4 MG: 2 INJECTION, SOLUTION INTRAMUSCULAR; INTRAVENOUS at 12:43

## 2021-01-01 RX ADMIN — EPHEDRINE SULFATE 10 MG: 50 INJECTION INTRAMUSCULAR; INTRAVENOUS; SUBCUTANEOUS at 13:19

## 2021-01-01 RX ADMIN — MORPHINE SULFATE 2 MG: 2 INJECTION, SOLUTION INTRAMUSCULAR; INTRAVENOUS at 20:30

## 2021-01-01 RX ADMIN — ATORVASTATIN CALCIUM 40 MG: 40 TABLET, FILM COATED ORAL at 01:40

## 2021-01-01 RX ADMIN — SODIUM BICARBONATE 50 MEQ: 84 INJECTION, SOLUTION INTRAVENOUS at 18:00

## 2021-01-01 RX ADMIN — Medication 2000 MG: at 20:30

## 2021-01-01 RX ADMIN — CEFTRIAXONE SODIUM 1000 MG: 1 INJECTION, POWDER, FOR SOLUTION INTRAMUSCULAR; INTRAVENOUS at 09:35

## 2021-01-01 RX ADMIN — EPINEPHRINE 1 MG: 1 INJECTION, SOLUTION, CONCENTRATE INTRAVENOUS at 17:56

## 2021-01-01 RX ADMIN — PHENYLEPHRINE HYDROCHLORIDE 100 MCG: 10 INJECTION INTRAVENOUS at 12:51

## 2021-01-01 RX ADMIN — LABETALOL HYDROCHLORIDE 300 MG: 200 TABLET, FILM COATED ORAL at 07:44

## 2021-01-01 RX ADMIN — LABETALOL HYDROCHLORIDE 300 MG: 200 TABLET, FILM COATED ORAL at 08:32

## 2021-01-01 RX ADMIN — Medication 10 ML: at 07:48

## 2021-01-01 RX ADMIN — ONDANSETRON 4 MG: 4 TABLET, ORALLY DISINTEGRATING ORAL at 09:49

## 2021-01-01 RX ADMIN — ENOXAPARIN SODIUM 40 MG: 40 INJECTION SUBCUTANEOUS at 07:47

## 2021-01-01 RX ADMIN — Medication 1 CAPSULE: at 08:32

## 2021-01-01 RX ADMIN — Medication 10 ML: at 07:49

## 2021-01-01 RX ADMIN — Medication 1 CAPSULE: at 07:47

## 2021-01-01 RX ADMIN — IOHEXOL 12 ML: 180 INJECTION INTRAVENOUS at 10:25

## 2021-01-01 RX ADMIN — SODIUM CHLORIDE: 9 INJECTION, SOLUTION INTRAVENOUS at 09:53

## 2021-01-01 RX ADMIN — MORPHINE SULFATE 2 MG: 2 INJECTION, SOLUTION INTRAMUSCULAR; INTRAVENOUS at 09:49

## 2021-01-01 RX ADMIN — SODIUM CHLORIDE 500 ML: 9 INJECTION, SOLUTION INTRAVENOUS at 10:01

## 2021-01-01 RX ADMIN — ATORVASTATIN CALCIUM 40 MG: 40 TABLET, FILM COATED ORAL at 21:36

## 2021-01-01 RX ADMIN — PHENYLEPHRINE HYDROCHLORIDE 100 MCG: 10 INJECTION INTRAVENOUS at 13:14

## 2021-01-01 RX ADMIN — EPHEDRINE SULFATE 5 MG: 50 INJECTION INTRAMUSCULAR; INTRAVENOUS; SUBCUTANEOUS at 13:13

## 2021-01-01 RX ADMIN — ATORVASTATIN CALCIUM 40 MG: 40 TABLET, FILM COATED ORAL at 20:29

## 2021-01-01 RX ADMIN — AMIODARONE HYDROCHLORIDE 150 MG: 50 INJECTION, SOLUTION INTRAVENOUS at 17:53

## 2021-01-01 RX ADMIN — EPINEPHRINE 1 MG: 1 INJECTION, SOLUTION, CONCENTRATE INTRAVENOUS at 18:02

## 2021-01-01 RX ADMIN — ATORVASTATIN CALCIUM 40 MG: 40 TABLET, FILM COATED ORAL at 20:58

## 2021-01-01 RX ADMIN — AZITHROMYCIN MONOHYDRATE 500 MG: 500 INJECTION, POWDER, LYOPHILIZED, FOR SOLUTION INTRAVENOUS at 07:43

## 2021-01-01 RX ADMIN — LABETALOL HYDROCHLORIDE 300 MG: 200 TABLET, FILM COATED ORAL at 20:35

## 2021-01-01 RX ADMIN — DOCUSATE SODIUM 100 MG: 100 CAPSULE ORAL at 08:34

## 2021-01-01 RX ADMIN — PHENYLEPHRINE HYDROCHLORIDE 100 MCG: 10 INJECTION INTRAVENOUS at 12:55

## 2021-01-01 RX ADMIN — ASPIRIN 81 MG: 81 TABLET, CHEWABLE ORAL at 08:32

## 2021-01-01 RX ADMIN — ACETAMINOPHEN 650 MG: 325 TABLET ORAL at 20:08

## 2021-01-01 RX ADMIN — LABETALOL HYDROCHLORIDE 300 MG: 200 TABLET, FILM COATED ORAL at 07:46

## 2021-01-01 RX ADMIN — AZITHROMYCIN MONOHYDRATE 500 MG: 500 INJECTION, POWDER, LYOPHILIZED, FOR SOLUTION INTRAVENOUS at 00:51

## 2021-01-01 RX ADMIN — PHENYLEPHRINE HYDROCHLORIDE 100 MCG: 10 INJECTION INTRAVENOUS at 13:06

## 2021-01-01 RX ADMIN — OXYCODONE 10 MG: 5 TABLET ORAL at 19:14

## 2021-01-01 RX ADMIN — CEFTRIAXONE SODIUM 1000 MG: 1 INJECTION, POWDER, FOR SOLUTION INTRAMUSCULAR; INTRAVENOUS at 09:24

## 2021-01-01 RX ADMIN — Medication 10 ML: at 08:33

## 2021-01-01 RX ADMIN — ENOXAPARIN SODIUM 40 MG: 40 INJECTION SUBCUTANEOUS at 08:32

## 2021-01-01 RX ADMIN — AZITHROMYCIN MONOHYDRATE 500 MG: 500 INJECTION, POWDER, LYOPHILIZED, FOR SOLUTION INTRAVENOUS at 07:47

## 2021-01-01 RX ADMIN — ASPIRIN 81 MG: 81 TABLET, CHEWABLE ORAL at 07:47

## 2021-01-01 RX ADMIN — AZITHROMYCIN MONOHYDRATE 500 MG: 500 INJECTION, POWDER, LYOPHILIZED, FOR SOLUTION INTRAVENOUS at 08:33

## 2021-01-01 RX ADMIN — Medication 5 MG: at 21:37

## 2021-01-01 RX ADMIN — Medication 10 ML: at 21:37

## 2021-01-01 RX ADMIN — Medication 10 ML: at 20:30

## 2021-01-01 RX ADMIN — CEFTRIAXONE SODIUM 1000 MG: 1 INJECTION, POWDER, FOR SOLUTION INTRAMUSCULAR; INTRAVENOUS at 21:28

## 2021-01-01 RX ADMIN — Medication 1 CAPSULE: at 07:43

## 2021-01-01 ASSESSMENT — PAIN DESCRIPTION - ORIENTATION
ORIENTATION: RIGHT

## 2021-01-01 ASSESSMENT — PULMONARY FUNCTION TESTS
PIF_VALUE: 4
PIF_VALUE: 4
PIF_VALUE: 5
PIF_VALUE: 3
PIF_VALUE: 4
PIF_VALUE: 4
PIF_VALUE: 17
PIF_VALUE: 9
PIF_VALUE: 4
PIF_VALUE: 20
PIF_VALUE: 4
PIF_VALUE: 4
PIF_VALUE: 3
PIF_VALUE: 4
PIF_VALUE: 6
PIF_VALUE: 6
PIF_VALUE: 4
PIF_VALUE: 5
PIF_VALUE: 0
PIF_VALUE: 4
PIF_VALUE: 4
PIF_VALUE: 38
PIF_VALUE: 5
PIF_VALUE: 0
PIF_VALUE: 4
PIF_VALUE: 4
PIF_VALUE: 5
PIF_VALUE: 24
PIF_VALUE: 4
PIF_VALUE: 0
PIF_VALUE: 27
PIF_VALUE: 4
PIF_VALUE: 26
PIF_VALUE: 6
PIF_VALUE: 5
PIF_VALUE: 0
PIF_VALUE: 4
PIF_VALUE: 21
PIF_VALUE: 4
PIF_VALUE: 5
PIF_VALUE: 4
PIF_VALUE: 6
PIF_VALUE: 29
PIF_VALUE: 5
PIF_VALUE: 4
PIF_VALUE: 5
PIF_VALUE: 4
PIF_VALUE: 5
PIF_VALUE: 6
PIF_VALUE: 4
PIF_VALUE: 4
PIF_VALUE: 19

## 2021-01-01 ASSESSMENT — PAIN SCALES - GENERAL
PAINLEVEL_OUTOF10: 7
PAINLEVEL_OUTOF10: 0
PAINLEVEL_OUTOF10: 0
PAINLEVEL_OUTOF10: 7
PAINLEVEL_OUTOF10: 0
PAINLEVEL_OUTOF10: 8
PAINLEVEL_OUTOF10: 0
PAINLEVEL_OUTOF10: 0
PAINLEVEL_OUTOF10: 9
PAINLEVEL_OUTOF10: 0
PAINLEVEL_OUTOF10: 0
PAINLEVEL_OUTOF10: 10
PAINLEVEL_OUTOF10: 7
PAINLEVEL_OUTOF10: 8

## 2021-01-01 ASSESSMENT — ENCOUNTER SYMPTOMS
COUGH: 1
ABDOMINAL PAIN: 0
COUGH: 0
BACK PAIN: 0
VOMITING: 0
NAUSEA: 0
CONSTIPATION: 1
SHORTNESS OF BREATH: 0
DIARRHEA: 0
NAUSEA: 0
RHINORRHEA: 0
BACK PAIN: 0
SHORTNESS OF BREATH: 0
VOMITING: 0
EYES NEGATIVE: 1

## 2021-01-01 ASSESSMENT — PAIN DESCRIPTION - PAIN TYPE
TYPE: ACUTE PAIN

## 2021-01-01 ASSESSMENT — PAIN DESCRIPTION - LOCATION
LOCATION: HEAD
LOCATION: HIP

## 2021-03-30 PROBLEM — J18.9 COMMUNITY ACQUIRED PNEUMONIA OF RIGHT MIDDLE LOBE OF LUNG: Status: ACTIVE | Noted: 2021-01-01

## 2021-03-30 NOTE — ED NOTES
Bed: 08  Expected date:   Expected time:   Means of arrival:   Comments:  [place] [address] [facility], RN  03/30/21 3977

## 2021-03-30 NOTE — ED PROVIDER NOTES
[facility]  ED  EMERGENCY DEPARTMENT ENCOUNTER        Pt Name: Ellen Nicolas  MRN: [id number]  [place] 3/17/1924  Date of evaluation: 3/30/2021  Provider: Taylor Patterson PA-C  PCP: DAVID Álvarez CNP  ED Attending: Merrill Echavarria      This patient was seen by the attending provider    History provided by the patient    CHIEF COMPLAINT:     Chief Complaint   Patient presents with    Fall     Patient comes into ED via [facility] with c/o falling at home after saying his legs gave out on him. Patient now c/o skin tear to left elbow. HISTORY OF PRESENT ILLNESS:      Ellen Nicolas is a 80 y.o. male who arrives to the ED by [facility]. The patient suffered a fall in his home today just prior to arrival.  Patient lives with his son and daughter-in-law. He states he ambulated with his walker (which is baseline) to the bathroom. While in the bathroom and before using the bathroom he reported his legs felt weak and gave out on him. He fell, causing a skin tear to his left lateral elbow but otherwise not sustaining any injuries. He was unable to get up on his own. His son was unable to assist him in getting up and ultimately called EMS. Patient states he has been sick for the last few days with \"flu symptoms\". He describes coughing and congestion. He denies fevers. He denies chest pain, shortness of breath, abdominal pain or GI upset. No identifiable exacerbating or alleviating factors to symptoms. Nursing Notes were reviewed     REVIEW OF SYSTEMS:     Review of Systems   Constitutional: Positive for fatigue. Negative for appetite change, chills and fever. HENT: Positive for congestion. Eyes: Negative. Respiratory: Positive for cough. Negative for shortness of breath. Cardiovascular: Negative for chest pain. Gastrointestinal: Negative for abdominal pain, diarrhea, nausea and vomiting. Genitourinary: Negative for difficulty urinating and dysuria.    Musculoskeletal: Negative for back pain and neck pain. Skin: Positive for wound. Neurological: Positive for weakness (generalized). Negative for headaches. All other systems reviewed and are negative. Except as noted above in the ROS, all other systems were reviewed and negative. PAST MEDICAL HISTORY:     Past Medical History:   Diagnosis Date    CAD (coronary artery disease)     Hypertension          SURGICAL HISTORY:      Past Surgical History:   Procedure Laterality Date    PACEMAKER INSERTION           CURRENT MEDICATIONS:       Previous Medications    ASPIRIN 81 MG TABLET    Take 81 mg by mouth daily. ATORVASTATIN (LIPITOR) 40 MG TABLET    Take 1 tablet by mouth nightly    CYANOCOBALAMIN 1000 MCG/ML INJECTION    Inject 1,000 mcg into the muscle once Once monthly    LABETALOL (NORMODYNE) 300 MG TABLET    Take 300 mg by mouth 2 times daily. MULTIPLE VITAMINS-MINERALS (MULTIVITAMIN PO)    Take  by mouth daily. VITAMIN E 1000 UNITS CAPSULE    Take 1,000 Units by mouth daily. ALLERGIES:    Patient has no known allergies. FAMILY HISTORY:     History reviewed. No pertinent family history. SOCIAL HISTORY:       Social History     Socioeconomic History    Marital status:       Spouse name: None    Number of children: None    Years of education: None    Highest education level: None   Occupational History    None   Social Needs    Financial resource strain: None    Food insecurity     Worry: None     Inability: None    Transportation needs     Medical: None     Non-medical: None   Tobacco Use    Smoking status: Never Smoker    Smokeless tobacco: Never Used   Substance and Sexual Activity    Alcohol use: Yes     Comment: ONCE A YEAR    Drug use: No    Sexual activity: None   Lifestyle    Physical activity     Days per week: None     Minutes per session: None    Stress: None   Relationships    Social connections     Talks on phone: None     Gets together: None Attends Judaism service: None     Active member of club or organization: None     Attends meetings of clubs or organizations: None     Relationship status: None    Intimate partner violence     Fear of current or ex partner: None     Emotionally abused: None     Physically abused: None     Forced sexual activity: None   Other Topics Concern    None   Social History Narrative    None       SCREENINGS:             PHYSICAL EXAM:       ED Triage Vitals   BP Temp Temp Source Pulse Resp SpO2 Height Weight   03/30/21 1900 03/30/21 1900 03/30/21 1900 03/30/21 1900 03/30/21 1858 03/30/21 1900 03/30/21 1858 03/30/21 1858   (!) 141/91 99.2 °F (37.3 °C) Oral 85 20 95 % 6' (1.829 m) 160 lb (72.6 kg)       Physical Exam    CONSTITUTIONAL: Awake and alert. Cooperative. Well-developed. Well-nourished. Non-toxic. No acute distress. HENT: Normocephalic. Atraumatic. External ears normal, without discharge. No nasal discharge. Oropharynx clear. Mucous membranes moist.  EYES: Conjunctiva non-injected. No scleral icterus. PERRL. EOM's grossly intact. NECK: Supple. Normal ROM. CARDIOVASCULAR: RRR. No Murmer. Intact distal pulses. PULMONARY/CHEST WALL: Effort normal. No tachypnea. Lungs clear to ausculation. ABDOMEN: Normal BS. Soft. Nondistended. No tenderness to palpate. No guarding. /ANORECTAL: Not assessed  MUSKULOSKELETAL: Normal ROM. No acute deformities. No edema. No tenderness to palpate. SKIN: Warm and dry. No rash. 4.5 cm skin tear left lateral elbow joint. See picture below. NEUROLOGICAL: Alert and oriented x 3. GCS 15. CN II-XII grossly intact. Strength is 5/5 in all extremities and sensation is intact. Normal gait.    PSYCHIATRIC: Normal affect      Left arm:            DIAGNOSTICRESULTS:     LABS:    Results for orders placed or performed during the hospital encounter of 03/30/21   SARS-CoV-2 NAAT (Rapid)    Specimen: Nasopharyngeal Swab; Throat   Result Value Ref Range    SARS-CoV-2, NAAT Not Detected Negative    Microscopic Examination Not Indicated     Urine Type NotGiven    Troponin   Result Value Ref Range    Troponin <0.01 <0.01 ng/mL   Lactic Acid, Plasma   Result Value Ref Range    Lactic Acid 1.5 0.4 - 2.0 mmol/L   Brain Natriuretic Peptide   Result Value Ref Range    Pro-BNP 1,541 (H) 0 - 449 pg/mL   EKG 12 Lead   Result Value Ref Range    Ventricular Rate 72 BPM    Atrial Rate 72 BPM    P-R Interval 264 ms    QRS Duration 90 ms    Q-T Interval 400 ms    QTc Calculation (Bazett) 438 ms    P Axis 51 degrees    R Axis -5 degrees    T Axis 25 degrees    Diagnosis       Sinus rhythm with 1st degree A-V block with Premature atrial complexesOtherwise normal ECGWhen compared with ECG of 25-SEP-2020 12:34,Sinus rhythm has replaced Electronic atrial pacemakerCriteria for Septal infarct are no longer Present         RADIOLOGY:  All x-ray studies areviewed/reviewed by me. Formal interpretations per the radiologist are as follows:      Xr Chest Portable    Result Date: 3/30/2021  EXAMINATION: ONE XRAY VIEW OF THE CHEST 3/30/2021 7:31 pm COMPARISON: 09/25/2020 HISTORY: ORDERING SYSTEM PROVIDED HISTORY: cough, weakness, fall TECHNOLOGIST PROVIDED HISTORY: Reason for exam:->cough, weakness, fall FINDINGS: Cardiac silhouette is enlarged. Left-sided cardiac device again noted. No pneumothorax. No pleural effusion. Opacity in the mid right lung peripherally. Pulmonary vascular congestion centrally. No acute bony abnormality. Opacity in the mid right lung peripherally concerning for infection. Pulmonary vascular congestion. EKG: The Ekg interpreted by me in the absence of a cardiologist shows.     normal sinus rhythm with a rate of 72      See also interpretation by Nohemi Echavarria MD.      PROCEDURES:   N/A    CRITICAL CARE TIME:     None      CONSULTS:  IP CONSULT TO HOSPITALIST      EMERGENCY DEPARTMENT COURSE and DIFFERENTIAL DIAGNOSIS/MDM:   Vitals:    Vitals:    03/30/21 1858 03/30/21 1900 consult. Patient has been hemodynamically stable in the ED. I spoke with [facility] hospitalists. We thoroughly discussed the history, physical exam, laboratory and imaging studies, as well as, emergency department course. Based upon that discussion, we've decided to admit Rip Perks for further observation and evaluation of Sj Jane's weakness and fall with findings of pneumonia. As I have deemed necessary from their history, physical, and studies, I have considered and evaluated Rip Perks for the following diagnoses:  ACUTE CORONARY SYNDROME, PERICARDIAL TAMPONADE, CHF, SEPSIS, COPD EXACERBATION, PNEUMONIA, PNEUMOTHORAX, PULMONARY EMBOLISM, and THORACIC DISSECTION. FINAL IMPRESSION:      1. Pneumonia due to organism    2. Pulmonary congestion    3. Generalized weakness    4. Fall, initial encounter    5.  Skin tear of left elbow without complication, initial encounter          DISPOSITION/PLAN:   DISPOSITION     ADMIT               (Please note thatportions of this note were completed with a voice recognition program.  Efforts were made to edit the dictations, but occasionally words are mis-transcribed.)    Robert Cook PA-C (electronicallysigned)              [place] , [place]  03/30/21 9620

## 2021-03-31 NOTE — PROGRESS NOTES
4 Eyes Skin Assessment     NAME:  Mariam Carty OF BIRTH:  3/17/1924  MEDICAL RECORD NUMBER:  [id number]    The patient is being assess for  Admission    I agree that 2 RN's have performed a thorough Head to Toe Skin Assessment on the patient. ALL assessment sites listed below have been assessed. Areas assessed by both nurses:    Head, Face, Ears, Shoulders, Back, Chest, Arms, Elbows, Hands, Sacrum. Buttock, Coccyx, Ischium and Legs. Feet and Heels        Does the Patient have a Wound? Yes wound(s) were present on assessment. LDA wound assessment was Initiated and completed      Pre-existing skin tear on left forearm from fall at home. See image of skin tear taken in ED.  Cleansed and foam dressing applied in ED       Solomon Prevention initiated:  Yes   Wound Care Orders initiated:  Yes    Pressure Injury (Stage 3,4, Unstageable, DTI, NWPT, and Complex wounds) if present place consult order under [de-identified] No    New and Established Ostomies if present place consult order under : No      Nurse 1 eSignature: Electronically signed by Jalyn Macias RN on 3/31/21 at 5:38 AM EDT    **SHARE this note so that the co-signing nurse is able to place an eSignature**    Nurse 2 eSignature: Electronically signed by Octavio Bourne RN on 3/31/21 at 5:40 AM EDT

## 2021-03-31 NOTE — PROGRESS NOTES
only): not applicable = 0    RSI: 0-4 = See once and convert to home regimen or discontinue        Plan       Goals: medication delivery, mobilize retained secretions, volume expansion and improve oxygenation    Patient/caregiver was educated on the proper method of use for Respiratory Care Devices:  No:       Level of patient/caregiver understanding able to:   ? Verbalize understanding   ? Demonstrate understanding       ? Teach back        ? Needs reinforcement       ? No available caregiver               ? Other:     Response to education:  Good     Is patient being placed on Home Treatment Regimen? No     Does the patient have everything they need prior to discharge? NA     Comments: pt assessed and chart reviewed    Plan of Care: 4/3    Electronically signed by Irl Krabbe, RCP on 3/31/2021 at 1:27 AM    Respiratory Protocol Guidelines     1. Assessment and treatment by Respiratory Therapy will be initiated for medication and therapeutic interventions upon initiation of aerosolized medication. 2. Physician will be contacted for respiratory rate (RR) greater than 35 breaths per minute. Therapy will be held for heart rate (HR) greater than 140 beats per minute, pending direction from physician. 3. Bronchodilators will be administered via Metered Dose Inhaler (MDI) with spacer when the following criteria are met:  a. Alert and cooperative     b. HR < 140 bpm  c. RR < 30 bpm                d. Can demonstrate a 23 second inspiratory hold  4. Bronchodilators will be administered via Hand Held Nebulizer SUNIL [facility]) to patients when ANY of the following criteria are met  a. Incognizant or uncooperative          b. Patients treated with HHN at Home        c. Unable to demonstrate proper use of MDI with spacer     d. RR > 30 bpm   5. Bronchodilators will be delivered via Metered Dose Inhaler (MDI), HHN, Aerogen to intubated patients on mechanical ventilation.   6. Inhalation medication orders will be delivered and/or substituted as outlined below. Aerosolized Medications Ordering and Administration Guidelines:    1. All Medications will be ordered by a physician, and their frequency and/or modality will be adjusted as defined by the patients Respiratory Severity Index (RSI) score. 2. If the patient does not have documented COPD, consider discontinuing anticholinergics when RSI is less than 9.  3. If the bronchospasm worsens (increased RSI), then the bronchodilator frequency can be increased to a maximum of every 4 hours. If greater than every 4 hours is required, the physician will be contacted. 4. If the bronchospasm improves, the frequency of the bronchodilator can be decreased, based on the patient's RSI, but not less than home treatment regimen frequency. 5. Bronchodilator(s) will be discontinued if patient has a RSI less than 9 and has received no scheduled or as needed treatment for 72  Hrs. Patients Ordered on a Mucolytic Agent:    1. Must always be administered with a bronchodilator. 2. Discontinue if patient experiences worsened bronchospasm, or secretions have lessened to the point that the patient is able to clear them with a cough. Anti-inflammatory and Combination Medications:    1. If the patient lacks prior history of lung disease, is not using inhaled anti-inflammatory medication at home, and lacks wheezing by examination or by history for at least 24 hours, contact physician for possible discontinuation.

## 2021-03-31 NOTE — PLAN OF CARE
Problem: Falls - Risk of:  Goal: Will remain free from falls  Description: Will remain free from falls  3/31/2021 1922 by Barbara Peacock RN  Outcome: Met This Shift  3/31/2021 0523 by Tye Valente RN  Outcome: Ongoing  Goal: Absence of physical injury  Description: Absence of physical injury  3/31/2021 1922 by Barbara Peacock RN  Outcome: Met This Shift  3/31/2021 0523 by Tye Valente RN  Outcome: Ongoing

## 2021-03-31 NOTE — ED NOTES
Report given to Ariana Yarbrough RN. Patient left via stretcher to floor room.      Nabor Purvis RN  03/31/21 3792

## 2021-03-31 NOTE — PROGRESS NOTES
Occupational Therapy   Occupational Therapy Initial Assessment/Treatment  Date: 3/31/2021   Patient Name: Randell Reddy  MRN: [id number]     : 3/17/1924    Date of Service: 3/31/2021    Discharge Recommendations:  24 hour supervision or assist, Home with Home health OT       Assessment   Performance deficits / Impairments: Decreased functional mobility ; Decreased ADL status; Decreased endurance;Decreased balance  Patient admitted from home, lives with family, rather IPTA uses 4ww at baseline. Today patient CGA for mobility with RW, modA for LE dressing. After evaluation, pt found to be presenting with the above mentioned occupational performance deficits which are affecting participation in daily living skills. Pt would benefit from continued skilled occupational therapy to address ADLs, functional mobility, and safety while in acute care. Prognosis: Good  Decision Making: Low Complexity  OT Education: OT Role;Plan of Care;Precautions; ADL Adaptive Strategies;Transfer Training;Equipment  Patient Education: Disease specific: . Patient educated on universal mask wearing and handwashing in order to decrease risk of COVID-19 transmission. Patient verbalized understanding of above education. REQUIRES OT FOLLOW UP: Yes  Activity Tolerance  Activity Tolerance: Patient Tolerated treatment well  Activity Tolerance: Vitals 97% on RA O2, Bp 106/54, HT 74 Bpm  Safety Devices  Safety Devices in place: Yes  Type of devices: Left in chair;Call light within reach;Nurse notified; Chair alarm in place;Gait belt           Patient Diagnosis(es): The primary encounter diagnosis was Pneumonia due to organism. Diagnoses of Pulmonary congestion, Generalized weakness, Fall, initial encounter, and Skin tear of left elbow without complication, initial encounter were also pertinent to this visit. has a past medical history of CAD (coronary artery disease) and Hypertension. has a past surgical history that includes Pacemaker insertion. Functional Limits     Balance  Sitting Balance: Supervision  Standing Balance: Contact guard assistance(with RW)  Functional Mobility  Functional - Mobility Device: Rolling Walker  Activity: Other(around bed to chair)  Assist Level: Contact guard assistance  Functional Mobility Comments: cues to stay within walker frame, pt uses 4ww at home. ADL  Feeding: Setup  Grooming: Setup  UE Dressing: Contact guard assistance  LE Dressing:  Moderate assistance     Tone RUE  RUE Tone: Normotonic  Tone LUE  LUE Tone: Normotonic  Coordination  Movements Are Fluid And Coordinated: Yes     Bed mobility  Supine to Sit: Stand by assistance  Sit to Supine: Unable to assess(up to chair at end of session)  Transfers  Sit to stand: Contact guard assistance(up to RW)  Stand to sit: Contact guard assistance     Cognition  Overall Cognitive Status: WFL        LUE AROM (degrees)  LUE AROM : WFL  RUE AROM (degrees)  RUE AROM : WFL  LUE Strength  Gross LUE Strength: WFL  RUE Strength  Gross RUE Strength: WFL         Plan   Plan  Times per week: 3-5x's a week while in acute care           AM-PAC Score        -[facility] Inpatient Daily Activity Raw Score: 19 (03/31/21 1259)  AM-PAC Inpatient ADL T-Scale Score : 40.22 (03/31/21 1259)  ADL Inpatient CMS 0-100% Score: 42.8 (03/31/21 1259)  ADL Inpatient CMS G-Code Modifier : CK (03/31/21 1259)    Goals  Short term goals  Time Frame for Short term goals: 1 week unless otherwise specified 4/07  Short term goal 1: Pt will complete toilet transfers with SBA by 4/5  Short term goal 2: Pt will complete LE dressing with CGA  Short term goal 3: Pt will complete standing level ADLs with SBA for balance  Patient Goals   Patient goals : \"to be able to go home\"       Therapy Time   Individual Concurrent Group Co-treatment   Time In 1026         Time Out 1059         Minutes 33         Timed Code Treatment Minutes: 23 Minutes       Zac Prado OTR/L  If pt is unable to be seen after this session, please let this note serve as discharge summary. Please see case management note for discharge disposition. Thank you.

## 2021-03-31 NOTE — PROGRESS NOTES
Hospitalist Progress Note      PCP: DAVID Olivares - CNP    Date of Admission: 3/30/2021    Chief Complaint: Cough, shortness of breath    Hospital Course: Presented with cough and shortness of breath. Diagnosed with community-acquired pneumonia. Started on IV antibiotics. Received Pfizer COVID-19 vaccine first dose 2 weeks ago. Feels same as yesterday. Subjective: No chest pain. Dry cough. Mild shortness of breath. No nausea or vomiting. Medications:  Reviewed    Infusion Medications    sodium chloride       Scheduled Medications    aspirin  81 mg Oral Daily    atorvastatin  40 mg Oral Nightly    labetalol  300 mg Oral BID    sodium chloride flush  10 mL Intravenous 2 times per day    enoxaparin  40 mg Subcutaneous Daily    azithromycin  500 mg Intravenous Q24H    cefTRIAXone (ROCEPHIN) IV  1,000 mg Intravenous Q24H    lactobacillus  1 capsule Oral Daily with breakfast     PRN Meds: sodium chloride flush, sodium chloride, promethazine **OR** ondansetron, polyethylene glycol, acetaminophen **OR** acetaminophen, ipratropium-albuterol      Intake/Output Summary (Last 24 hours) at 3/31/2021 1103  Last data filed at 3/31/2021 2360  Gross per 24 hour   Intake 610 ml   Output 325 ml   Net 285 ml       Physical Exam Performed:    /62   Pulse 75   Temp 97.9 °F (36.6 °C) (Oral)   Resp 16   Ht 6' (1.829 m)   Wt 174 lb 11.2 oz (79.2 kg)   SpO2 93%   BMI 23.69 kg/m²     General appearance: No apparent distress, appears stated age and cooperative. HEENT: Pupils equal, round, and reactive to light. Conjunctivae/corneas clear. Neck: Supple, with full range of motion. No jugular venous distention. Trachea midline. Respiratory:  Normal respiratory effort. Right posterior inspiratory crackles. No wheezes. No rhonchi. Cardiovascular: Regular rate and rhythm with normal S1/S2 without murmurs, rubs or gallops. Abdomen: Soft, non-tender, non-distended with normal bowel sounds. statin    Hypertension  Continue blood pressure medication. Currently blood pressure is controlled. Coronary artery disease  Asymptomatic. Troponin negative. Continue aspirin, statin, beta-blocker as before. Appears inactive. Discussed with the patient. Questions answered      DVT Prophylaxis: Lovenox  Diet: DIET GENERAL;  Code Status: Full Code    PT/OT Eval Status: Requested    Dispo -inpatient stay, pending COVID-19 test results and clinical improvement. Anticipate 1 or 2 more days unless COVID-19 is positive.     Luciana iDego MD

## 2021-03-31 NOTE — ED NOTES
Completed wound care on pt's left arm laceration with chlorhexidine, 4x4 gauze and normal saline. Laceration scrubbed clean and properly irrigated. Wound dressed with a silicone faced band aid. Pt tolerated well.  Hayden Zamora made aware     Claude Campbell  03/30/21 1791

## 2021-03-31 NOTE — PROGRESS NOTES
Patient admitted from ED to bed 544. Pt alert and oriented x4. Patient oriented to room, call light, bed rails, lights and bathroom. Patient instructed about the schedule of the day including: vital sign frequency, lab draws, and I &O's. Pt verbalized understanding. Pt resting quietly in bed. Bed locked, in lowest position, side rails up 2/4, call light within reach. Bed alarm on.   Will continue to monitor

## 2021-03-31 NOTE — ED NOTES

## 2021-03-31 NOTE — H&P
Hospital Medicine History & Physical      PCP: Claudia Brower, APRN - CNP    Date of Admission: 3/30/2021    Date of Service: Pt seen/examined on 3/30/2021 and Admitted to Inpatient     Chief Complaint:  Fall, weakness      History Of Present Illness: The patient is a 80 y.o. male who presents to [facility] with PMHx: CAD, hypertension, pacemaker. Has never used tobacco or smoked. Patient lives at home with son and daughter-in-law. Patient reports he received the single Pfizer Covid vaccination almost 2 weeks ago. Patient reported to the emergency department this evening from home via EMS after he fell in his bathroom. He walks with a walker. No loss of consciousness. Denied any injury. Basically reporting that he has had a mild cough and generally has not felt well for the past 3 or 4 days. Reported some mild body aches and flulike symptoms. Denied nausea, vomiting, diarrhea. Denied hematuria or dysuria. Denies change in appetite that he is aware of. No change in medications. Patient states that basically his legs just gave out on him. ED work-up reveals evidence of a right middle lobe pneumonia without sepsis. No tachycardia no hypotension, no hypoxia. ED provider requested admission for CAP. Rocephin and azithromycin was started in the emergency department    On my exam the patient is awake alert and oriented. Pleasant. Denies complaints. Lung fields clear. No evidence of distress. No evidence of peripheral edema.     CODE STATUS: Full      Past Medical History:        Diagnosis Date    CAD (coronary artery disease)     Hypertension        Past Surgical History:        Procedure Laterality Date    PACEMAKER INSERTION         Medications Prior to Admission:    Prior to Admission medications    Medication Sig Start Date End Date Taking? Authorizing Provider   atorvastatin (LIPITOR) 40 MG tablet Take 1 tablet by mouth nightly 9/26/20   DAVID Amaya - CNP   cyanocobalamin 1000 MCG/ML injection Inject 1,000 mcg into the muscle once Once monthly    Historical Provider, MD   aspirin 81 MG tablet Take 81 mg by mouth daily. Historical Provider, MD   labetalol (NORMODYNE) 300 MG tablet Take 300 mg by mouth 2 times daily. Historical Provider, MD   Multiple Vitamins-Minerals (MULTIVITAMIN PO) Take  by mouth daily. Historical Provider, MD   vitamin E 1000 UNITS capsule Take 1,000 Units by mouth daily. Historical Provider, MD       Allergies:  Patient has no known allergies. Social History:  The patient currently lives at home    TOBACCO:   reports that he has never smoked. He has never used smokeless tobacco.  ETOH:   reports current alcohol use. Family History:  Reviewed in detail and negative for DM, Early CAD, Cancer, CVA. Positive as follows:    History reviewed. No pertinent family history. REVIEW OF SYSTEMS:   Positive for mild general weakness and as noted in the HPI. All other systems reviewed and negative. PHYSICAL EXAM:    BP (!) 156/107   Pulse 72   Temp 98 °F (36.7 °C) (Oral)   Resp 16   Ht 6' (1.829 m)   Wt 174 lb 11.2 oz (79.2 kg)   SpO2 94%   BMI 23.69 kg/m²     General appearance: No apparent distress appears stated age and cooperative. HEENT Normal cephalic, atraumatic without obvious deformity. Pupils equal, round, and reactive to light. Extra ocular muscles intact. Conjunctivae/corneas clear. Neck: Supple, No jugular venous distention/bruits. Trachea midline without thyromegaly or adenopathy with full range of motion. Lungs: Clear to auscultation, bilaterally without Rales/Wheezes/Rhonchi with good respiratory effort.   Heart: Regular rate and rhythm with Normal S1/S2 without murmurs, rubs or gallops, point of maximum impulse non-displaced  Abdomen: Soft, non-tender or non-distended without rigidity or guarding and positive bowel sounds all four quadrants. Extremities: No clubbing, cyanosis, or edema bilaterally. Full range of motion without deformity and normal gait intact. Skin: Skin color, texture, turgor normal.  No rashes or lesions. Neurologic: Alert and oriented X 3, neurovascularly intact with sensory/motor intact upper extremities/lower extremities, bilaterally. Cranial nerves: II-XII intact, grossly non-focal.  Mental status: Alert, oriented, thought content appropriate. Capillary Refill: Acceptable  < 3 seconds  Peripheral Pulses: +3 Easily felt, not easily obliterated with pressure      CXR:  I have reviewed the CXR with the following interpretation: Right middle lobe opacity  EKG:  I have reviewed the EKG with the following interpretation: Sinus rhythm, first-degree block rate 72. No evidence of STEMI or ischemia      CBC   Recent Labs     03/30/21 1903   WBC 7.0   HGB 11.6*   HCT 33.6*         RENAL  Recent Labs     03/30/21 1903   *   K 4.5      CO2 23   BUN 24*   CREATININE 1.2     LFT'S  Recent Labs     03/30/21 1903   AST 26   ALT 22   BILITOT 0.6   ALKPHOS 100     COAG  No results for input(s): INR in the last 72 hours.   CARDIAC ENZYMES  Recent Labs     03/30/21 1903   TROPONINI <0.01       U/A:    Lab Results   Component Value Date    COLORU Yellow 03/30/2021    WBCUA None seen 03/03/2019    RBCUA 10-20 03/03/2019    BACTERIA Rare 03/03/2019    CLARITYU Clear 03/30/2021    SPECGRAV 1.025 03/30/2021    LEUKOCYTESUR Negative 03/30/2021    BLOODU Negative 03/30/2021    GLUCOSEU Negative 03/30/2021       ABG  No results found for: WCC8QYK, BEART, K7SNZATL, PHART, THGBART, UDQ5MOM, PO2ART, WRC4IYS        Active Hospital Problems    Diagnosis Date Noted    Community acquired pneumonia of right middle lobe of lung [J18.9] 03/30/2021         PHYSICIANS CERTIFICATION:    I certify that Rafael Avendano is expected to be hospitalized for less than 2 midnights based on the following assessment and plan:      ASSESSMENT/PLAN:    CAP: Right middle lobe: As evidenced per chest x-ray  No leukocytosis: No left shift. Lactic acid 1.5-> recheck in a.m. Blood cultures x2  Procalcitonin 0.12  No hypotension tachycardia, no hypoxia or encephalopathy  No evidence of endorgan damage  Gentle IVF hydration  Continue azithromycin and Rocephin  Probiotics  Respiratory consult, oxygen therapy PRN and  titration maintaining saturation greater than 92%  Nebulizer as needed for shortness of breath or wheezing    CAD, hypertension: Continue aspirin, statin, labetalol per home regimen    DVT Prophylaxis: Lovenox  Diet: DIET GENERAL;  Code Status: Full Code  PT/OT Eval Status: consulted, independent, walks w/ walker    Dispo - admit, inpt       DAVID Nicolas - CNP    Thank you DAVID Vivar - KELVIN for the opportunity to be involved in this patient's care. If you have any questions or concerns please feel free to contact me at [phone].

## 2021-03-31 NOTE — ED PROVIDER NOTES
Emergency Department Provider Note     Location: [address] [place] [address]  ED  3/30/2021    I independently performed a history and physical on Auther Friendly. All diagnostic, treatment, and disposition decisions were made by myself in conjunction with the mid-level provider. Briefly, this is a 80 y.o. male here for fall; generalized weakness; left elbow injury     ED Triage Vitals   BP Temp Temp Source Pulse Resp SpO2 Height Weight   03/30/21 1900 03/30/21 1900 03/30/21 1900 03/30/21 1900 03/30/21 1858 03/30/21 1900 03/30/21 1858 03/30/21 1858   (!) 141/91 99.2 °F (37.3 °C) Oral 85 20 95 % 6' (1.829 m) 160 lb (72.6 kg)        Patient resting comfortably in no acute distress. Frail  Heart is regular rate and rhythm. Lungs coarse breath sounds but clear to auscultation bilaterally. Abdomen is soft, nondistended, and nontender. Left elbow skin tear with FROM No peripheral edema noted. Patient seen and examined. EKG  The Ekg interpreted by me in the absence of a cardiologist shows. Sinus rhythm with first-degree AV block and PACs  Axis is   Normal  QTc is  normal  Intervals and Durations are unremarkable. No specific ST-T wave changes appreciated. No evidence of acute ischemia. No significant change from prior EKG dated electronic pacemaker rhythm seen on September 25, 2020      Xr Chest Portable    Result Date: 3/30/2021  EXAMINATION: ONE XRAY VIEW OF THE CHEST 3/30/2021 7:31 pm COMPARISON: 09/25/2020 HISTORY: ORDERING SYSTEM PROVIDED HISTORY: cough, weakness, fall TECHNOLOGIST PROVIDED HISTORY: Reason for exam:->cough, weakness, fall FINDINGS: Cardiac silhouette is enlarged. Left-sided cardiac device again noted. No pneumothorax. No pleural effusion. Opacity in the mid right lung peripherally. Pulmonary vascular congestion centrally. No acute bony abnormality. Opacity in the mid right lung peripherally concerning for infection. Pulmonary vascular congestion.          Results for orders placed or performed during the hospital encounter of 03/30/21   SARS-CoV-2 NAAT (Rapid)    Specimen: Nasopharyngeal Swab; Throat   Result Value Ref Range    SARS-CoV-2, NAAT Not Detected Not Detected   CBC auto differential   Result Value Ref Range    WBC 7.0 4.0 - 11.0 K/uL    RBC 3.06 (L) 4.20 - 5.90 M/uL    Hemoglobin 11.6 (L) 13.5 - 17.5 g/dL    Hematocrit 33.6 (L) 40.5 - 52.5 %    .7 (H) 80.0 - 100.0 fL    MCH 37.7 (H) 26.0 - 34.0 pg    MCHC 34.4 31.0 - 36.0 g/dL    RDW 15.6 (H) 12.4 - 15.4 %    Platelets 502 587 - 667 K/uL    MPV 8.0 5.0 - 10.5 fL    Neutrophils % 83.5 %    Lymphocytes % 7.2 %    Monocytes % 8.1 %    Eosinophils % 1.0 %    Basophils % 0.2 %    Neutrophils Absolute 5.8 1.7 - 7.7 K/uL    Lymphocytes Absolute 0.5 (L) 1.0 - 5.1 K/uL    Monocytes Absolute 0.6 0.0 - 1.3 K/uL    Eosinophils Absolute 0.1 0.0 - 0.6 K/uL    Basophils Absolute 0.0 0.0 - 0.2 K/uL   Comprehensive metabolic panel   Result Value Ref Range    Sodium 134 (L) 136 - 145 mmol/L    Potassium 4.5 3.5 - 5.1 mmol/L    Chloride 101 99 - 110 mmol/L    CO2 23 21 - 32 mmol/L    Anion Gap 10 3 - 16    Glucose 164 (H) 70 - 99 mg/dL    BUN 24 (H) 7 - 20 mg/dL    CREATININE 1.2 0.8 - 1.3 mg/dL    GFR Non- 56 (A) >60    GFR African American >60 >60    Calcium 9.1 8.3 - 10.6 mg/dL    Total Protein 6.6 6.4 - 8.2 g/dL    Albumin 3.8 3.4 - 5.0 g/dL    Albumin/Globulin Ratio 1.4 1.1 - 2.2    Total Bilirubin 0.6 0.0 - 1.0 mg/dL    Alkaline Phosphatase 100 40 - 129 U/L    ALT 22 10 - 40 U/L    AST 26 15 - 37 U/L    Globulin 2.8 g/dL   Sample possible blood bank testing   Result Value Ref Range    Specimen Status SOFIA    Urinalysis, reflex to microscopic   Result Value Ref Range    Color, UA Yellow Straw/Yellow    Clarity, UA Clear Clear    Glucose, Ur Negative Negative mg/dL    Bilirubin Urine Negative Negative    Ketones, Urine Negative Negative mg/dL    Specific Gravity, UA 1.025 1.005 - 1.030    Blood, Urine Negative Negative    pH, UA 6.0 5.0 - 8.0    Protein, UA Negative Negative mg/dL    Urobilinogen, Urine 1.0 <2.0 E.U./dL    Nitrite, Urine Negative Negative    Leukocyte Esterase, Urine Negative Negative    Microscopic Examination Not Indicated     Urine Type NotGiven    Troponin   Result Value Ref Range    Troponin <0.01 <0.01 ng/mL   Lactic Acid, Plasma   Result Value Ref Range    Lactic Acid 1.5 0.4 - 2.0 mmol/L   Brain Natriuretic Peptide   Result Value Ref Range    Pro-BNP 1,541 (H) 0 - 449 pg/mL   EKG 12 Lead   Result Value Ref Range    Ventricular Rate 72 BPM    Atrial Rate 72 BPM    P-R Interval 264 ms    QRS Duration 90 ms    Q-T Interval 400 ms    QTc Calculation (Bazett) 438 ms    P Axis 51 degrees    R Axis -5 degrees    T Axis 25 degrees    Diagnosis       Sinus rhythm with 1st degree A-V block with Premature atrial complexesOtherwise normal ECGWhen compared with ECG of 25-SEP-2020 12:34,Sinus rhythm has replaced Electronic atrial pacemakerCriteria for Septal infarct are no longer Present     EKG interpreted by me in the emergency department sinus of the first-degree AV block with PACs and a rate of 72  Otherwise normal EKG;  Sinus rhythm has been replaced with electronic pacemaker    For further details of Jus Osuna emergency department encounter, please see Mouna Bear's documentation. 1. Pneumonia due to organism    2. Pulmonary congestion    3. Generalized weakness    4. Fall, initial encounter    5. Skin tear of left elbow without complication, initial encounter      This chart was generated in part by using Dragon Dictation system and may contain errors related to that system including errors in grammar, punctuation, and spelling, as well as words and phrases that may be inappropriate. If there are any questions or concerns please feel free to contact the dictating provider for clarification.          Randell Greenfield MD  04/01/21 8897

## 2021-03-31 NOTE — CARE COORDINATION
CASE MANAGEMENT INITIAL ASSESSMENT      Reviewed chart and completed assessment  With: Pt  Explained Case Management role/services. Primary contact information: Son [facility] [place] Decision Maker :   Primary Decision Maker: Lucero Orta - [phone]    Secondary Decision Maker: Colin Klein - [phone]          Can this person be reached and be able to respond quickly, such as within a few minutes or hours? Yes  Who would be your back-up decision maker? Name  Daughter in [place]. Phone Number: [phone]    Admit date/status: IP, 3/30/21  Diagnosis:Community Acquired Pneumonia. Is this a Readmission?:  No      Insurance: [employer] Precert required for SNF: Yes       3 night stay required: No    Living arrangements, Adls, care needs, prior to admission: Lives in a two story house with son and daughter in law. Independent in all ADL's, uses 2WW when ambulating and family drives. Transportation: family     Durable Medical Equipment at home:  Walker_X_Cane__RTS__ BSC__Shower Chair__  02__ HHN__ CPAP__  BiPap__  Hospital Bed__ W/C___ Other__________    Services in the home and/or outpatient, prior to admission: None        PT/OT recs: Home 24 hr, PT/OT  Hospital Exemption Notification (HEN): needed for SNF, not initiated. Barriers to discharge: None    Plan/comments: CM met with pt at bedside and DIL on the phone for initial assessment. Plan is to return back home with current support system in place. Discussed [facility] and both wanting to discuss before making a decision. Will call DIL back tomorrow.  CM following-Veronika Jang RN       ECOC on chart for MD signature

## 2021-03-31 NOTE — PROGRESS NOTES
Physical Therapy    Facility/Department: [facility] C5 - MED SURG/ORTHO  Initial Assessment and treatment    NAME: Xiao Louie  : 3/17/1924  MRN: [id number]    Date of Service: 3/31/2021    Discharge Recommendations:  24 hour supervision or assist, Home with Home health PT   PT Equipment Recommendations  Equipment Needed: No    Assessment   Body structures, Functions, Activity limitations: Decreased functional mobility ; Decreased balance;Decreased posture  Assessment: Pt referred for PT evaluation during current hospital stay with a diagnosis of back pain. Pt is functioning mildly below baseline, requiring SBA for bed mobility and CGA for transfers and gait with RW. Pt would benefit from skilled acute PT to address deficits. Recommend home with 24 hr sup/assist and HHPT at DC from acute setting  Treatment Diagnosis: impaired mobility  Prognosis: Good  Decision Making: Medium Complexity  PT Education: Goals;Gait Training;PT Role;Disease Specific Education;Plan of Care; Functional Mobility Training;Home Exercise Program;Transfer Training  Patient Education: pt verbalized understanding of importance of OOB activity  Barriers to Learning: no  REQUIRES PT FOLLOW UP: Yes  Activity Tolerance  Activity Tolerance: Patient Tolerated treatment well  Activity Tolerance: /52; HR 72; O2 sats 97% on RA at EOS       Patient Diagnosis(es): The primary encounter diagnosis was Pneumonia due to organism. Diagnoses of Pulmonary congestion, Generalized weakness, Fall, initial encounter, and Skin tear of left elbow without complication, initial encounter were also pertinent to this visit. has a past medical history of CAD (coronary artery disease) and Hypertension. has a past surgical history that includes Pacemaker insertion.     Restrictions  Restrictions/Precautions  Restrictions/Precautions: Fall Risk, Up as Tolerated, Isolation  Position Activity Restriction  Other position/activity restrictions: COVID-19 Rule out, droplet plus, IV lines, telemetry  Vision/Hearing  Vision: Impaired  Vision Exceptions: Wears glasses for reading  Hearing: Exceptions to [facility]  Hearing Exceptions: Hard of hearing/hearing concerns     Subjective  General  Chart Reviewed: Yes  Patient assessed for rehabilitation services?: Yes  Response To Previous Treatment: Not applicable  Family / Caregiver Present: No  Referring Practitioner: KELVIN Multani  Referral Date : 03/30/21  Diagnosis: back pain  Follows Commands: Within Functional Limits  General Comment  Comments: Pt resting in bed upon entry, RN cleared pt for therapy  Subjective  Subjective: PT agreeable to PT  Pain Screening  Patient Currently in Pain: No  Vital Signs  Patient Currently in Pain: Denies  Pre Treatment Pain Screening  Intervention List: Patient able to continue with treatment    Orientation  Orientation  Overall Orientation Status: Within Normal Limits  Social/Functional History  Social/Functional History  Lives With: Family(son and his wife)  Type of Home: House  Home Layout: Two level, Able to Live on Main level with bedroom/bathroom  Home Access: Stairs to enter with rails  Entrance Stairs - Number of Steps: 2 TERRI  Entrance Stairs - Rails: Left  Bathroom Shower/Tub: Walk-in shower, Shower chair with back  Bathroom Toilet: Standard  Bathroom Equipment: Shower chair, Grab bars in [address], 4 wheeled walker  Receives Help From: Family  ADL Assistance: Independent  Homemaking Responsibilities: Yes  Meal Prep Responsibility: Secondary  Laundry Responsibility: No  Cleaning Responsibility: No  Ambulation Assistance: Independent  Transfer Assistance: Independent  Active : No  Patient's  Info: son provides transport  Mode of Transportation: Family  Occupation: Retired  Type of occupation: farming, water delivery for [employer],   Leisure & Hobbies: reading         Objective          AROM RLE (degrees)  RLE AROM: WFL  AROM LLE (degrees)  LLE AROM : [facility] Strength RLE  Strength RLE: WFL  Strength LLE  Strength LLE: WFL        Bed mobility  Supine to Sit: Stand by assistance  Sit to Supine: Unable to assess(up in chair at EOS)  Scooting: Stand by assistance(to EOB)  Transfers  Sit to Stand: Contact guard assistance  Stand to sit: Contact guard assistance  Bed to Chair: Contact guard assistance(with RW)  Ambulation  Ambulation?: Yes  Ambulation 1  Surface: level tile  Device: Rolling Walker  Assistance: Contact guard assistance  Quality of Gait: slow rama and pace, mildly unsteady but no LOB, forward flexed at trunk  Distance: 15 ft     Balance  Posture: Fair  Sitting - Static: Good  Sitting - Dynamic: Good  Standing - Static: Fair  Standing - Dynamic: Fair  Exercises  Heelslides: x 10 B  Hip Flexion: x 10 B  Hip Abduction: x 10 B clamshells  Knee Long Arc Quad: x 10 B  Ankle Pumps: x 10 B     Plan   Plan  Times per week: 3-5  Current Treatment Recommendations: Strengthening, Neuromuscular Re-education, Home Exercise Program, ROM, Balance Training, Endurance Training, Functional Mobility Training, Transfer Training, Gait Training, Stair training  Safety Devices  Type of devices:  All fall risk precautions in place, Left in chair, Call light within reach, Chair alarm in place, Nurse notified, Gait belt, Patient at risk for falls      AM-PAC Score  AM-PAC Inpatient Mobility Raw Score : 18 (03/31/21 1307)  AM-PAC Inpatient T-Scale Score : 43.63 (03/31/21 1307)  Mobility Inpatient CMS 0-100% Score: 46.58 (03/31/21 1307)  Mobility Inpatient CMS G-Code Modifier : CK (03/31/21 1307)          Goals  Short term goals  Time Frame for Short term goals: 4/4/21 unless noted  Short term goal 1: PT will perform bed mobility with mod I by 4/3/21  Short term goal 2: Pt will perform transfers with SBA  Short term goal 3: Pt will ambulate 20 ft with RW and SBA  Short term goal 4: Pt will negotatie 2 stairs with CGA and LRAD  Patient Goals   Patient goals : \"to go home\"       Therapy Time   Individual Concurrent Group Co-treatment   Time In 1026         Time Out 1059         Minutes 33          10 min eval    If pt is discharged prior to next therapy session, this note will serve as discharge summary.     Aurea Garcia, MINERVA

## 2021-04-01 NOTE — PLAN OF CARE
Problem: Airway Clearance - Ineffective:  Goal: Ability to maintain a clear airway will improve  Description: Ability to maintain a clear airway will improve  Outcome: Ongoing     Problem: Falls - Risk of:  Goal: Absence of physical injury  Description: Absence of physical injury  Outcome: Ongoing

## 2021-04-01 NOTE — PLAN OF CARE
Problem: Falls - Risk of:  Goal: Will remain free from falls  Description: Will remain free from falls  4/1/2021 0038 by Olamide Lee RN  Outcome: Ongoing  Note: Bed in lowest position, wheels locked, 2/4 side rails up, nonskid footwear on. Bed/ chair check alarm in place, call light within reach. Pt instructed to call out when needing assistance. Pt stated understanding. Nurse will continue to monitor.

## 2021-04-01 NOTE — PROGRESS NOTES
Perfect Serve:  Pt's pcr covid came back negative. Would you like the isolation removed or wait for the dayshift physician?  Thank you  Response:  Can be removed

## 2021-04-01 NOTE — DISCHARGE INSTR - COC
Continuity of Care Form    Patient Name: Zenon Caceres   :  3/17/1924  MRN:  [id number]    Admit date:  3/30/2021  Discharge date:  ***    Code Status Order: Full Code   Advance Directives:   Advance Care Flowsheet Documentation       Date/Time Healthcare Directive Type of Healthcare Directive Copy in [address] Agent's Name Healthcare Agent's Phone Number    21 0130  Yes, patient has an advance directive for healthcare treatment    No, copy requested from family  Raysa Physician:  Adebayo Cole MD  PCP: DAVID Wood CNP    Discharging Nurse: [facility] Unit/Room#: 7056/3012-56  Discharging Unit Phone Number: ***    Emergency Contact:   Extended Emergency Contact Information  Primary Emergency Contact: Fredi Phone: [phone]  Mobile Phone: [phone]  Relation: Child   needed? No  Secondary Emergency Contact: [facility] [place] Phone: [phone]  Mobile Phone: [phone]  Relation: Other   needed?  No    Past Surgical History:  Past Surgical History:   Procedure Laterality Date    PACEMAKER INSERTION         Immunization History:   Immunization History   Administered Date(s) Administered    Tdap (Boostrix, Adacel) 2014       Active Problems:  Patient Active Problem List   Diagnosis Code    TIA (transient ischemic attack) G45.9    Cardiac pacemaker in situ Z95.0    Sinoatrial node dysfunction (HCC) I49.5    HTN (hypertension), benign I10    Altered mental status R41.82    Community acquired pneumonia of right middle lobe of lung J18.9       Isolation/Infection:   Isolation            No Isolation          Patient Infection Status       Infection Onset Added Last Indicated Last Indicated By Review Planned Expiration Resolved Resolved By    None active    Resolved    COVID-19 Rule Out 21 COVID-19 (Ordered)   21 Rule-Out Test Resulted COVID-19 Rule Out 03/30/21 03/30/21 03/30/21 SARS-CoV-2 NAAT (Rapid) (Ordered)   03/30/21 Rule-Out Test Resulted            Nurse Assessment:  Last Vital Signs: BP (!) 145/62   Pulse 65   Temp 98.1 °F (36.7 °C) (Oral)   Resp 16   Ht 6' (1.829 m)   Wt 174 lb 11.2 oz (79.2 kg)   SpO2 96%   BMI 23.69 kg/m²     Last documented pain score (0-10 scale): Pain Level: 0  Last Weight:   Wt Readings from Last 1 Encounters:   03/31/21 174 lb 11.2 oz (79.2 kg)     Mental Status:  {IP PT MENTAL STATUS:20030}    IV Access:  { KO IV ACCESS:126416351}    Nursing Mobility/ADLs:  Walking   {CHP DME TYOQ:165877221}  Transfer  {CHP DME OCGA:379530873}  Bathing  {CHP DME QXZB:134861021}  Dressing  {CHP DME NBEM:712601491}  Toileting  {CHP DME FQMO:449041023}  Feeding  {CHP DME TIAQ:517793790}  Med Admin  {CHP DME ZFGZ:998803076}  Med Delivery   { KO MED Delivery:904329235}    Wound Care Documentation and Therapy:  Wound 11/11/14 Skin tear Arm Left; Anterior (Active)   Number of days: 2332       Wound 03/31/21 Arm Left (Active)   Wound Image   03/31/21 0130   Wound Etiology Skin Tear 04/01/21 0739   Dressing Status Clean;Dry; Intact 04/01/21 0739   Dressing/Treatment Foam 04/01/21 0739   Number of days: 1        Elimination:  Continence:   · Bowel: {YES / EI:05857}  · Bladder: {YES / WK:38009}  Urinary Catheter: {Urinary Catheter:122618484}   Colostomy/Ileostomy/Ileal Conduit: {YES / JW:58453}       Date of Last BM: ***    Intake/Output Summary (Last 24 hours) at 4/1/2021 1031  Last data filed at 4/1/2021 0934  Gross per 24 hour   Intake 360 ml   Output 2025 ml   Net -1665 ml     I/O last 3 completed shifts:   In: 480 [P.O.:480]  Out: 1875 [Urine:1875]    Safety Concerns:     [address] [employer] Safety Concerns:312648235}    Impairments/Disabilities:      [address] [employer] Impairments/Disabilities:584475743}    Nutrition Therapy:  Current Nutrition Therapy:   [address] [employer] Diet List:287944390}    Routes of Feeding: {CHP DME Other Feedings:420381252}

## 2021-04-01 NOTE — PROGRESS NOTES
Occupational Therapy  Facility/Department: [facility] C5 - MED SURG/ORTHO  Daily Treatment Note  NAME: Thomas Beaulieu  : 3/17/1924  MRN: [id number]    Date of Service: 2021    Discharge Recommendations:  24 hour supervision or assist, Home with Home health OT     Assessment   Performance deficits / Impairments: Decreased functional mobility ; Decreased ADL status; Decreased endurance;Decreased balance;Decreased safe awareness  Assessment: Pt demos mild unsteadiness with RW during ADLs and functional mobility requiring CGA-min A for balance. Pt demos poor safe hand placement with functional transfers, receptive to education on safe hand placement but would benefit from reinforcement. Pt functioning below his baseline and would benefit from continued skilled OT in home setting at d/c. Prognosis: Good  OT Education: OT Role;Plan of Care;Precautions; ADL Adaptive Strategies;Transfer Training  Patient Education: Disease specific: safety with transfers, role of OT  REQUIRES OT FOLLOW UP: Yes  Activity Tolerance  Activity Tolerance: Patient Tolerated treatment well  Activity Tolerance: Vitals: BP= 135/56, HR= 73, SPO2= 95% RA  Safety Devices  Safety Devices in place: Yes  Type of devices: Left in chair;Chair alarm in place;Call light within reach;Nurse notified;Gait belt         Patient Diagnosis(es): The primary encounter diagnosis was Pneumonia due to organism. Diagnoses of Pulmonary congestion, Generalized weakness, Fall, initial encounter, and Skin tear of left elbow without complication, initial encounter were also pertinent to this visit. has a past medical history of CAD (coronary artery disease) and Hypertension. has a past surgical history that includes Pacemaker insertion.     Restrictions  Restrictions/Precautions  Restrictions/Precautions: Fall Risk, Up as Tolerated  Position Activity Restriction  Other position/activity restrictions: COVID-19 Rule out, droplet plus, IV lines, telemetry     Subjective General  Chart Reviewed: Yes, Orders, Progress Notes, Labs  Patient assessed for rehabilitation services?: Yes  Response to previous treatment: Patient with no complaints from previous session  Family / Caregiver Present: No  Referring Practitioner: DAVID Severino CNP 3/31/21  Diagnosis: fall, right middle lobe PNA (community aquired)    Subjective  Subjective: Pt resting in bed, pleasant and agreeable to OT treatment. Vital Signs  Patient Currently in Pain: Denies     Orientation  Orientation  Overall Orientation Status: Within Normal Limits     Objective    ADL  Grooming: Contact guard assistance(in stance at sink to brush teeth and wash face)  UE Bathing: Setup;Supervision(seated with bath wipes)  LE Bathing: Setup;Contact guard assistance(sitting/standing with bath wipes)  LE Dressing: Minimal assistance; Increased time to complete(briefs and socks)     Balance  Sitting Balance: Supervision  Standing Balance: Minimal assistance(CGA-min A with RW)  Standing Balance  Activity: functional mobility, sink ADLs, LE bathing/dressing    Functional Mobility  Functional - Mobility Device: Rolling Walker  Activity: To/from bathroom  Assist Level: Minimal assistance  Functional Mobility Comments: cues for safety, pt mildly unsteady with forward flexed posture    Bed mobility  Supine to Sit: Supervision(to L with HOB elevated)     Transfers  Sit to stand: Contact guard assistance  Stand to sit: Minimal assistance  Transfer Comments: cues for safety     Cognition  Safety Judgement: Decreased awareness of need for safety      Plan   Plan  Times per week: 3-5x's a week while in acute care    AM-PAC Score  AM-[facility] Inpatient Daily Activity Raw Score: 19 (04/01/21 1348)  AM-PAC Inpatient ADL T-Scale Score : 40.22 (04/01/21 1348)  ADL Inpatient CMS 0-100% Score: 42.8 (04/01/21 1348)  ADL Inpatient CMS G-Code Modifier : CK (04/01/21 1348)    Goals  Short term goals  Time Frame for Short term goals: 1 week unless

## 2021-04-01 NOTE — PROGRESS NOTES
Physical Therapy  Facility/Department: [facility] C5 - MED SURG/ORTHO  Daily Treatment Note  NAME: Ellen Nicolas  : 3/17/1924  MRN: [id number]    Date of Service: 2021    Discharge Recommendations:  24 hour supervision or assist, Home with Home health PT   PT Equipment Recommendations  Equipment Needed: No    Assessment   Body structures, Functions, Activity limitations: Decreased functional mobility ; Decreased balance;Decreased posture  Assessment: Pt requiring CGA for bed mobility and CGA for transfers and CGA/Min A for gait with RW. Pt would benefit from skilled acute PT to address deficits. Recommend home with 24 hr sup/assist and HHPT at [place] from acute setting  Treatment Diagnosis: impaired mobility  Prognosis: Good  Decision Making: Medium Complexity  Barriers to Learning: no  REQUIRES PT FOLLOW UP: Yes  Activity Tolerance  Activity Tolerance: Patient Tolerated treatment well  Activity Tolerance: BP= 135/56, HR= 73, SPO2= 95% RA     Patient Diagnosis(es): The primary encounter diagnosis was Pneumonia due to organism. Diagnoses of Pulmonary congestion, Generalized weakness, Fall, initial encounter, and Skin tear of left elbow without complication, initial encounter were also pertinent to this visit. has a past medical history of CAD (coronary artery disease) and Hypertension. has a past surgical history that includes Pacemaker insertion. Restrictions  Restrictions/Precautions  Restrictions/Precautions: Fall Risk, Up as Tolerated  Position Activity Restriction  Other position/activity restrictions: COVID-19 Rule out, droplet plus, IV lines, telemetry  Subjective   General  Chart Reviewed: Yes  Response To Previous Treatment: Patient with no complaints from previous session.   Family / Caregiver Present: No  Subjective  Subjective: PT agreeable to PT  General Comment  Comments: RN cleared pt for therapy  Pain Screening  Patient Currently in Pain: Denies  Vital Signs  Patient Currently in Pain: Denies Individual Concurrent Group Co-treatment   Time In 1409         Time Out 1440         Minutes 31         Timed Code Treatment Minutes: [address]

## 2021-04-02 NOTE — DISCHARGE SUMMARY
sounds. Musculoskeletal:  No clubbing, cyanosis or edema bilaterally. Full range of motion without deformity. Skin: Skin color, texture, turgor normal.  No rashes or lesions. Neurologic:  Neurovascularly intact without any focal sensory/motor deficits. Cranial nerves: II-XII intact, grossly non-focal.  Psychiatric:  Alert and oriented, thought content appropriate, normal insight  Capillary Refill: Brisk,< 3 seconds   Peripheral Pulses: +2 palpable, equal bilaterally       Labs: For convenience and continuity at follow-up the following most recent labs are provided:      CBC:    Lab Results   Component Value Date    WBC 4.4 04/02/2021    HGB 10.5 04/02/2021    HCT 30.8 04/02/2021     04/02/2021       Renal:    Lab Results   Component Value Date     04/01/2021    K 5.0 04/01/2021    K 4.5 03/31/2021     04/01/2021    CO2 25 04/01/2021    BUN 24 04/01/2021    CREATININE 1.1 04/01/2021    CALCIUM 9.1 04/01/2021         Significant Diagnostic Studies    Radiology:   XR CHEST PORTABLE   Final Result   Opacity in the mid right lung peripherally concerning for infection. Pulmonary vascular congestion.                 Consults:     IP CONSULT TO HOSPITALIST  IP CONSULT TO SOCIAL WORK    Disposition: Home health care    Condition at Discharge: Stable    Discharge Instructions/Follow-up: Home health care, follow-up with PCP    Code Status:  Full Code     Activity: activity as tolerated    Diet: regular diet      Discharge Medications:     Current Discharge Medication List           Details   lactobacillus (CULTURELLE) capsule Take 1 capsule by mouth daily (with breakfast)  Qty: 30 capsule, Refills: 0      cefdinir (OMNICEF) 300 MG capsule Take 1 capsule by mouth 2 times daily for 5 days  Qty: 10 capsule, Refills: 0              Details   atorvastatin (LIPITOR) 40 MG tablet Take 1 tablet by mouth nightly  Qty: 30 tablet, Refills: 3      cyanocobalamin 1000 MCG/ML injection Inject 1,000 mcg into the muscle once Once monthly      aspirin 81 MG tablet Take 81 mg by mouth daily. labetalol (NORMODYNE) 300 MG tablet Take 300 mg by mouth 2 times daily. Multiple Vitamins-Minerals (MULTIVITAMIN PO) Take  by mouth daily. vitamin E 1000 UNITS capsule Take 1,000 Units by mouth daily. Time Spent on discharge is more than 45 minutes in the examination, evaluation, counseling and review of medications and discharge plan. Signed:    Mela Disla MD   4/2/2021      Thank you DAVID Mccain CNP for the opportunity to be involved in this patient's care. If you have any questions or concerns please feel free to contact me at [phone].

## 2021-04-02 NOTE — PLAN OF CARE
Problem: Falls - Risk of:  Goal: Will remain free from falls  Description: Will remain free from falls  4/2/2021 1121 by Armand Harris RN  Outcome: Completed  4/2/2021 1105 by Armand Harris RN  Outcome: Ongoing     Problem: Airway Clearance - Ineffective:  Goal: Clear lung sounds  Description: Clear lung sounds  4/2/2021 1121 by Armand Harris RN  Outcome: Completed  4/2/2021 1105 by Armand Harris RN  Outcome: Ongoing

## 2021-04-02 NOTE — CARE COORDINATION
CASE MANAGEMENT DISCHARGE SUMMARY      Discharge to: Home with family/24 hr  And [facility]. through [facility]. New Durable Medical Equipment ordered/agency: None    Transportation: Family    Confirmed discharge plan with: Spoke to daughter in law Ranjith Vance on the phone on this day. Salenarandi Garciazara concerned with pt coming home. Discussed therapy notes and recommendations and the likelyhood of [employer] not paying for a skilled stay in a rehab. Ranjith Vance voiced understanding and will pick-up pt. Patient: yes   Facility/Agency, name:  [facility] faxed- Spoke to [facility] with [place].      RN, name: [id number]    [id number] VAN BENNETT

## 2021-04-02 NOTE — PLAN OF CARE
Problem: Falls - Risk of:  Goal: Will remain free from falls  Description: Will remain free from falls  Outcome: Ongoing     Problem: Discharge Planning:  Goal: Participates in care planning  Description: Participates in care planning  Outcome: Ongoing

## 2021-04-02 NOTE — PROGRESS NOTES
Discharge instructions given to pt and pt's family. Educate about Fall prevention. Pt has Alert neckless and family is in process to get walk in shower at their house.

## 2021-05-24 NOTE — ED PROVIDER NOTES
[facility]  ED  EMERGENCY DEPARTMENT ENCOUNTER      Pt Name: Edilberto Allen  MRN: [id number]  [place] 3/17/1924  Date of evaluation: 5/24/2021  Provider: Aamir Singer MD    CHIEF COMPLAINT       Chief Complaint   Patient presents with    Urinary Retention     has not urinated since noon    Constipation     has not had a bowel movement since thursday         HISTORY OF PRESENT ILLNESS   (Location/Symptom, Timing/Onset,Context/Setting, Quality, Duration, Modifying Factors, Severity)  Note limiting factors. Edilberto Allen is a 80 y.o. male who presents to the emergency department for constipation. Patient states his last bowel movement was on Friday. He is also not been able to urinate since about noon. The patient has had normal appetite. No nausea or vomiting. His daughter-in-law states he has had similar episodes about 3 years ago. The patient has been doing well otherwise. He tried MiraLAX with no improvement which prompted his visit here to the emergency room. Nursing notes were reviewed. REVIEW OF SYSTEMS    (2-9 systems for level 4, 10 or more for level 5)     Review of Systems   Constitutional: Negative for appetite change and fever. HENT: Negative for rhinorrhea. Respiratory: Negative for cough and shortness of breath. Cardiovascular: Negative for chest pain. Gastrointestinal: Positive for constipation. Negative for nausea and vomiting. Genitourinary: Positive for difficulty urinating. Musculoskeletal: Negative for back pain. Skin: Negative for rash. Neurological: Negative for headaches. Hematological: Does not bruise/bleed easily.          PAST MEDICAL HISTORY     Past Medical History:   Diagnosis Date    CAD (coronary artery disease)     Hypertension          SURGICALHISTORY       Past Surgical History:   Procedure Laterality Date    PACEMAKER INSERTION           CURRENT MEDICATIONS       Previous Medications    ASPIRIN 81 MG TABLET    Take 81 mg Abused:     Physically Abused:     Sexually Abused:        SCREENINGS    [place] Coma Scale  Eye Opening: Spontaneous  Best Verbal Response: Oriented  Best Motor Response: Obeys commands  [place] Coma Scale Score: 15        PHYSICAL EXAM    (up to 7 for level 4, 8 or more for level 5)     ED Triage Vitals [05/24/21 0148]   BP Temp Temp Source Pulse Resp SpO2 Height Weight   (!) 197/94 98.2 °F (36.8 °C) Oral 73 15 98 % 6' (1.829 m) 165 lb (74.8 kg)       Physical Exam  Vitals and nursing note reviewed. Exam conducted with a chaperone present. Constitutional:       Appearance: Normal appearance. He is well-developed. He is not ill-appearing. HENT:      Head: Normocephalic and atraumatic. Right Ear: External ear normal.      Left Ear: External ear normal.      Nose: Nose normal.   Eyes:      General: No scleral icterus. Right eye: No discharge. Left eye: No discharge. Conjunctiva/sclera: Conjunctivae normal.   Cardiovascular:      Rate and Rhythm: Normal rate and regular rhythm. Heart sounds: Normal heart sounds. Pulmonary:      Effort: Pulmonary effort is normal. No respiratory distress. Breath sounds: Normal breath sounds. No wheezing or rales. Abdominal:      General: Bowel sounds are normal. There is no distension. Palpations: Abdomen is soft. Tenderness: There is no abdominal tenderness. There is no guarding or rebound. Genitourinary:     Comments: Patient appears to have firm bulky stool in the rectum unable to actually evacuate no obvious impaction  Musculoskeletal:      Cervical back: Neck supple. Skin:     Coloration: Skin is not pale. Neurological:      Mental Status: He is alert.    Psychiatric:         Mood and Affect: Mood normal.         Behavior: Behavior normal.             DIAGNOSTIC RESULTS     EKG: All EKG's are interpreted by the Emergency Department Physician who either signs or Co-signs this chart in the absence of a cardiologist.    12 lead EKG shows     RADIOLOGY:   Non-plain film images such as CT, Ultrasound and MRI are read by the radiologist. Plain radiographic images are visualized and preliminarily interpreted by the emergency physician with the below findings:        Interpretation per the Radiologist below, if available at the time of this note:    CT ABDOMEN PELVIS WO CONTRAST Additional Contrast? None   Final Result   1. Possible stercoral proctitis/fecal impaction. 2. Diverticulosis without scan evidence for diverticulitis. 3. Inferior mesenteric fat stranding is nonspecific. There is no abnormality   in the adjacent small bowel. Also nonspecific is the central mesenteric   calcifications. 4. Nonobstructing right renal calculus.                ED BEDSIDE ULTRASOUND:   Performed by ED Physician - none    LABS:  Labs Reviewed   CBC WITH AUTO DIFFERENTIAL - Abnormal; Notable for the following components:       Result Value    RBC 3.12 (*)     Hemoglobin 11.4 (*)     Hematocrit 33.5 (*)     .2 (*)     MCH 36.5 (*)     Lymphocytes Absolute 0.5 (*)     All other components within normal limits    Narrative:     Performed at:  [address],  [address] [place] [address], [facility]   Phone [phone]   BASIC METABOLIC PANEL W/ REFLEX TO MG FOR LOW K - Abnormal; Notable for the following components:    Sodium 133 (*)     Glucose 110 (*)     BUN 29 (*)     GFR Non- 56 (*)     All other components within normal limits    Narrative:     Performed at:  [address],  [address] [place] [address], [address]   Phone [phone]   URINE RT REFLEX TO CULTURE - Abnormal; Notable for the following components:    Blood, Urine SMALL (*)     All other components within normal limits    Narrative:     Performed at:  [address],  [address] [place] [address], [address]   Phone [phone]   MICROSCOPIC URINALYSIS - Abnormal; Notable for the following components:    RBC, UA 5-10 (*)     All other components within normal limits    Narrative:     Performed at:  [facility]) [facility]  [address] [place] [address],  [place], [facility]   Phone [phone]       All other labs were within normal range or not returned as of this dictation. EMERGENCY DEPARTMENT COURSE and DIFFERENTIAL DIAGNOSIS/MDM:   Vitals:    Vitals:    05/24/21 0148 05/24/21 0217   BP: (!) 197/94 (!) 175/56   Pulse: 73 71   Resp: 15 16   Temp: 98.2 °F (36.8 °C)    TempSrc: Oral    SpO2: 98% 99%   Weight: 165 lb (74.8 kg)    Height: 6' (1.829 m)        Adult male who comes in for inability to urinate since today and constipation since Friday, basic laboratory studies ordered as well as a bladder scan. Bladder scan shows greater than 400 cc of urine and therefore Espinoza catheter is placed. Although the patient does not have any abdominal pain I do order a CT scan of his abdomen pelvis given the patient's age and his inability to void both urine and stool. CT scan showed no significant acute abdominal process he does have what appears to be a fecal impaction. The patient is given a soapsuds enema and has a large bowel movement. I believe that this is the most likely cause of his urinary retention. Laboratory studies showed no acute process. I discussed the case with the patient and his power of  based on history, physical exam and diagnostic work-up I believe that he is able to be discharged home. In the past he has had this constipation had urinary retention. He did follow-up with urology and had no abnormality seen. I recommend close follow-up with his primary care provider who can then refer him to urology as needed. The Espinoza catheter will be discontinued. CRITICAL CARE TIME   None       CONSULTS:  None    PROCEDURES:       Procedures    FINAL IMPRESSION      1. Constipation, unspecified constipation type    2.  Urinary retention          DISPOSITION/PLAN   DISPOSITION Discharge - Pending Orders Complete 05/24/2021 04:28:39 AM      PATIENT REFERREDTO:  Carlos Hernandez, APRN - CNP  [address]  [phone]    In 2 days        DISCHARGEMEDICATIONS:  New Prescriptions    DOCUSATE SODIUM (COLACE) 100 MG CAPSULE    Take 1 capsule by mouth 2 times daily          (Please note that portions of this note were completed with a voice recognition program.  Efforts were made to edit the dictations but occasionally words are mis-transcribed.)    Amy Clark MD (electronically signed)  Attending Emergency Physician       Amy Clark MD  05/24/21 0709

## 2021-05-24 NOTE — ED NOTES
Soap suds enema performed per order. Per Order placed on monitor prior to initiation. Patient placed in left supine position and instilled 250 warm enema, able to hold for approx 3 mins with minimal output. An additional 250 ML was inserted and patient was able to hold for 5 mins noted to have some semi formed stool expelled.  On [facility] with call Roque Parham RN  05/24/21 4305

## 2021-06-19 NOTE — ED NOTES
Patient identified as a positive fall risk on the ED triage fall screening. Patient placed in fall precautions which includes:  yellow fall risk bracelet on wrist,patient wearing shoes, \"Be Safe\" sign placed on patient's door, and bed alarm placed under patient/alarm turned on. Patient instructed on importance of not getting out of bed or ambulating without assistance for safety.              Mercedes Sutton RN  06/19/21 6759

## 2021-06-19 NOTE — ED PROVIDER NOTES
[facility] Emergency Department    CHIEF COMPLAINT  Headache (pt c/o headache and dizziness since this morning. partial relief of symptoms after taking tylenol. pt c/o generalized weakness. denies having unilateral weakness), Dizziness, and Constipation (c/o of constipation. But, states last BM was yesterday)      SHARED SERVICE VISIT  Evaluated by GUEVARA. My supervising physician was available for consultation. HISTORY OF PRESENT ILLNESS  Pankaj Denis is a 80 y.o. male who presents to the ED complaining of headache behind his eyes, blurred vision as well as tenderness upon standing. Patient states he took some Tylenol with moderate relief. Has had headaches in the past.  Denies any anticoagulation. Denies any double vision, difficulty with speech, swallowing or ambulating. Denies any weakness in his extremities. Denies any numbness. Said generalized weakness. Reports some lightheadedness upon standing but resolves when patient is sitting and resting. Denies any illnesses. No chest pain reported breathing. No other complaints, modifying factors or associated symptoms. Nursing notes reviewed. Past Medical History:   Diagnosis Date    CAD (coronary artery disease)     Hypertension      Past Surgical History:   Procedure Laterality Date    PACEMAKER INSERTION       History reviewed. No pertinent family history. Social History     Socioeconomic History    Marital status:       Spouse name: Not on file    Number of children: Not on file    Years of education: Not on file    Highest education level: Not on file   Occupational History    Not on file   Tobacco Use    Smoking status: Never Smoker    Smokeless tobacco: Never Used   Vaping Use    Vaping Use: Never used   Substance and Sexual Activity    Alcohol use: Yes     Comment: ONCE A YEAR    Drug use: No    Sexual activity: Not on file   Other Topics Concern    Not on file   Social History Narrative Normocephalic. Atraumatic. Nontender palpation of the maxillary frontal sinuses. Nontender palpation of the temporal arteries  EYES: PERRL. EOM's grossly intact. No painful EOMs. Conjunctive a is not injected or erythematous. Visual fields intact. ENT: Mucous membranes are moist.   NECK: Supple. HEART: RRR. No murmurs. LUNGS: Respirations unlabored. CTAB. Good air exchange. Speaking comfortably in full sentences. ABDOMEN: Soft. Non-distended. Non-tender. No guarding or rebound. No masses. No organomegaly. EXTREMITIES: No peripheral edema. Moves all extremities equally. All extremities neurovascularly intact. SKIN: Warm and dry. No acute rashes. NEUROLOGICAL: Alert and oriented. CN's 2-12 intact. No gross facial drooping. Strength 5/5, sensation intact. NIH SS 0  PSYCHIATRIC: Normal mood and affect. RADIOLOGY  CT HEAD WO CONTRAST   Final Result   No acute hemorrhage or midline shift. Other findings as described.                LABS  Labs Reviewed   CBC WITH AUTO DIFFERENTIAL - Abnormal; Notable for the following components:       Result Value    WBC 3.9 (*)     RBC 3.16 (*)     Hemoglobin 11.4 (*)     Hematocrit 33.6 (*)     .2 (*)     MCH 36.2 (*)     RDW 16.8 (*)     Lymphocytes Absolute 0.6 (*)     All other components within normal limits    Narrative:     Performed at:  [place] [address] [address]   Phone [phone]   COMPREHENSIVE METABOLIC PANEL - Abnormal; Notable for the following components:    Sodium 132 (*)     Chloride 98 (*)     Glucose 124 (*)     BUN 27 (*)     All other components within normal limits    Narrative:     Performed at:  [place] [address] [address]   Phone [phone]   TROPONIN    Narrative:     Performed at:  [place] [address] [address]   Phone [phone] PROCEDURES  Unless otherwise noted below, none  Procedures    ED COURSE   Triage vitals within normal limits. A discussion was had with patient and patient's family regarding importance of following up with ophthalmology. Also discussed this patient with the attending Dr. Kervin Edmonds who personally evaluated the patient and agreed on the low likelihood of acute CVA at this time. .  Risk management discussed and shared decision making had with patient and/or surrogate. All questions were answered. Patient will follow up with ophthalmology for further evaluation/treatment. All questions answered. Patient will return to ED for new/worsening symptoms. MDM  MDM   49-year-old male presents emergency department for evaluation of a headache and some lightheadedness and weakness upon standing. Symptoms started today. NIH SS 0. Patient is alert and oriented. His exam is rather unremarkable. No reproducible tenderness on exam.  Patient does report some bilateral blurriness in his vision that also began today. No double vision. Symptoms are relieved with sitting and aggravated by standing. Obtain basic lab work as well as a CT of the head to evaluate for any acute intercranial abnormalities. Lab work was obtained which demonstrates a BUN/creatinine of 27-1 0.0. No electrolyte abnormalities. CBC appears to be baseline. Within normal limits. Patient given a 650 mg of Tylenol. CT of the head was performed and demonstrated no acute hemorrhage or midline shift. Discussed the case with the supervising attending Dr. Po Sheriff who agreed to personally evaluate the patient. Upon our discussion and evaluation I believe that the most likely cause of the patient's bilateral visual decrease in acuity is most likely secondary to ophthalmology changes. These changes have been ongoing for multiple weeks. There is no diplopia.   On patient's course in the ED patient reported improvement of his symptoms after having a bowel movement. Patient was given Tylenol with improvement of his headache. Patient was given contact information for the [facility] and discharged home to follow-up with ophthalmology as well as primary care provider. I was able to discuss with the patient as well as the family members return precautions which they were able to verbalize understanding of these. DISPOSITION  Patient was discharged to home in good condition. CLINICAL IMPRESSION  1. Nonintractable headache, unspecified chronicity pattern, unspecified headache type    2. Decreased visual acuity    3.  Dizziness           Elise GINO Cantu  06/24/21 0107

## 2021-06-20 NOTE — ED NOTES
Ambulate pt approx 40 feet with a walker. Pt complained of feeling slightly dizzy but no other complaints. Gait is well formed and steady. Presley Kong MD made aware.      Claude Campbell  06/19/21 2057
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
## 2021-11-14 NOTE — ED NOTES
Bed: 06  Expected date:   Expected time:   Means of arrival:   Comments:  Surgery      Emiliano Dakins, RN  11/14/21 4459

## 2021-11-14 NOTE — ED NOTES
Dr. Jhoan Sorto consulted over telephone reference this patient.      Pavithra Varghese RN  11/14/21 7481

## 2021-11-14 NOTE — CONSULTS
[facility] Orthopedic Surgery  Consult Note         This patient is seen in consultation at the request of Dr Gamal Johnson MD    Reason for Consult:  Right femur intertrochanteric fracture. CHIEF COMPLAINT:  Right hip pain/ fall    History Obtained From:  patient, family member - son and daughter in law, electronic medical record    HISTORY OF PRESENT ILLNESS:    Mr. Lencho Parker 80 y.o.  male [place] [facility] and evaluation of a right hip pain which occurred when he fell in his bathroom early this morning. He is complaining of right hip pain. This is better with rest and worse with bearing any wt. The pain is sharp and not radiating. No numbness or tingling sensation. No other complaint. He was [place] [facility], came via EMS, where he was x-rayed and orthopedic was consulted. Past Medical History:        Diagnosis Date    CAD (coronary artery disease)     Hypertension        Past Surgical History:        Procedure Laterality Date    PACEMAKER INSERTION         Medications prior to admission:   Prior to Admission medications    Medication Sig Start Date End Date Taking? Authorizing Provider   rivaroxaban (XARELTO) 15 MG TABS tablet Take 15 mg by mouth daily (with breakfast)   Yes Historical Provider, MD   docusate sodium (COLACE) 100 MG capsule Take 1 capsule by mouth 2 times daily 5/24/21   Parker Hawkins MD   atorvastatin (LIPITOR) 40 MG tablet Take 1 tablet by mouth nightly 9/26/20   DAVID Swartz - CNP   cyanocobalamin 1000 MCG/ML injection Inject 1,000 mcg into the muscle once Once monthly    Historical Provider, MD   aspirin 81 MG tablet Take 81 mg by mouth daily. Historical Provider, MD   labetalol (NORMODYNE) 300 MG tablet Take 300 mg by mouth 2 times daily. Historical Provider, MD   Multiple Vitamins-Minerals (MULTIVITAMIN PO) Take  by mouth daily. Historical Provider, MD   vitamin E 1000 UNITS capsule Take 1,000 Units by mouth daily. Historical Provider, MD       Current Medications:   Current Facility-Administered Medications: aspirin chewable tablet 81 mg, 81 mg, Oral, Daily  atorvastatin (LIPITOR) tablet 40 mg, 40 mg, Oral, Nightly  docusate sodium (COLACE) capsule 100 mg, 100 mg, Oral, BID  labetalol (NORMODYNE) tablet 300 mg, 300 mg, Oral, BID  sodium chloride flush 0.9 % injection 5-40 mL, 5-40 mL, IntraVENous, 2 times per day  sodium chloride flush 0.9 % injection 5-40 mL, 5-40 mL, IntraVENous, PRN  0.9 % sodium chloride infusion, 25 mL, IntraVENous, PRN  acetaminophen (TYLENOL) tablet 650 mg, 650 mg, Oral, Q6H PRN **OR** acetaminophen (TYLENOL) suppository 650 mg, 650 mg, Rectal, Q6H PRN  ondansetron (ZOFRAN-ODT) disintegrating tablet 4 mg, 4 mg, Oral, Q8H PRN **OR** ondansetron (ZOFRAN) injection 4 mg, 4 mg, IntraVENous, Q6H PRN  polyethylene glycol (GLYCOLAX) packet 17 g, 17 g, Oral, Daily PRN  morphine (PF) injection 2 mg, 2 mg, IntraVENous, Q4H PRN  0.9 % sodium chloride infusion, , IntraVENous, Continuous  [COMPLETED] ceFAZolin (ANCEF) 2000 mg in sterile water 20 mL IV syringe, 2,000 mg, IntraVENous, On Call to OR  oxyCODONE (ROXICODONE) immediate release tablet 5 mg, 5 mg, Oral, Q4H PRN **OR** oxyCODONE (ROXICODONE) immediate release tablet 10 mg, 10 mg, Oral, Q4H PRN    Allergies:  Patient has no known allergies. Social History     Socioeconomic History    Marital status:       Spouse name: Not on file    Number of children: Not on file    Years of education: Not on file    Highest education level: Not on file   Occupational History    Not on file   Tobacco Use    Smoking status: Never Smoker    Smokeless tobacco: Never Used   Vaping Use    Vaping Use: Never used   Substance and Sexual Activity    Alcohol use: Not Currently     Comment: ONCE A YEAR    Drug use: No    Sexual activity: Not on file   Other Topics Concern    Not on file   Social History Narrative    Not on file     Social Determinants of Health Financial Resource Strain:     Difficulty of Paying Living Expenses: Not on file   Food Insecurity:     Worried About Running Out of Food in the Last Year: Not on file    Jae of Food in the Last Year: Not on file   Transportation Needs:     Lack of Transportation (Medical): Not on file    Lack of Transportation (Non-Medical): Not on file   Physical Activity:     Days of Exercise per Week: Not on file    Minutes of Exercise per Session: Not on file   Stress:     Feeling of Stress : Not on file   Social Connections:     Frequency of Communication with Friends and Family: Not on file    Frequency of Social Gatherings with Friends and Family: Not on file    Attends [employer] Services: Not on file    Active Member of [address] [place] [address] [employer] or Organizations: Not on file    Attends Club or Organization Meetings: Not on file    Marital Status: Not on file   Intimate Partner Violence:     Fear of Current or Ex-Partner: Not on file    Emotionally Abused: Not on file    Physically Abused: Not on file    Sexually Abused: Not on file   Housing Stability:     Unable to Pay for Housing in the Last Year: Not on file    Number of [place] in the Last Year: Not on file    Unstable Housing in the Last Year: Not on file       Family History:  History reviewed. No pertinent family history. REVIEW OF SYSTEMS:   CONSTITUTIONAL: Denies unexplained weight loss, fevers, chills or fatigue  NEUROLOGICAL: Denies unsteady gait or progressive weakness    PSYCHOLOGICAL: Denies anxiety, depression   SKIN: Denies skin changes, delayed healing, rash, itching   HEMATOLOGIC: Denies easy bleeding or bruising  ENDOCRINE: Denies excessive thirst, urination, heat/cold  RESPIRATORY: Denies current dyspnea, cough  CARDIOVASCULAR: Negative for chest pain at this time. EYES: Negative for photophobia and visual disturbance. ENT:  Negative for rhinorrhea, epistaxis, sore throat, or hearing loss.   GI: Denies nausea, vomiting, diarrhea : Denies bowel or bladder issues   MUSCULOSKELETAL: Right hip pain. All other ROS reviewed in chart or with patient or family and are grossly negative. PHYSICAL EXAMINATION:  Mr. Myrna Angel is a very pleasant 80 y.o. male who seen today in no acute distress, awake, alert, and oriented. He is well nourished and groomed. Patient with normal affect. Body mass index is 22.38 kg/m². . Skin warm and dry. Resting respiratory rate is 16. Resp deep and easy. Pulse is with regular rate and rhythm    BP (!) 145/53   Pulse 71   Temp 98 °F (36.7 °C) (Oral)   Resp 17   Ht 6' (1.829 m)   Wt 165 lb (74.8 kg)   SpO2 97%   BMI 22.38 kg/m²        Airway is intact  Chest: chest clear, no wheezing, rales, normal symmetric air entry, no tachypnea, retractions or cyanosis  Heart: regular rate and rhythm ; heart sounds normal   Hearing intact, pupil equal and reactive bilateral  Lymphatics; No groin or axillary enlarged lymph nodes. Neck; No swelling  Abdomen; soft, non distended. MUSCULOSKELETAL:   Right leg in external rotation with pain with ROM, Examination of both lower extrimiteis showing a decreased range of motion and muscle power of the right hip compare to the other side because of pain. There is mild swelling that can be seen, and ecchymosis over the right hip, but no wound. He has intact sensation and good pedal pulses bilateral.  He has significant tenderness on deep palpation over the right hip. Good strength, and no instability both upper and the other lower extremities. NVI bilateral lower and upper extermities.      NEUROLOGIC:   Sensory:    Touch:                     Right Upper Extremity:  normal                   Left Upper Extremity:  normal                  Right Lower Extremity:  normal                  Left Lower Extremity:  normal        DATA:    CBC:   Lab Results   Component Value Date    WBC 3.8 11/14/2021    RBC 2.88 11/14/2021    HGB 10.9 11/14/2021    HCT 32.1 11/14/2021    MCV 111.7 11/14/2021    MCH 38.0 11/14/2021    MCHC 34.0 11/14/2021    RDW 15.8 11/14/2021     11/14/2021    MPV 7.4 11/14/2021     WBC:    Lab Results   Component Value Date    WBC 3.8 11/14/2021     PT/INR:    Lab Results   Component Value Date    PROTIME 17.4 11/14/2021    INR 1.51 11/14/2021     PTT:    Lab Results   Component Value Date    APTT 32.7 11/30/2012   [APTT    IMAGING: CT scan and X-rays were taken 11/14/2021, 3 views of the right hip and AP pelvis, was reviewed and showed a comminuted 3-4 parts intertrochanteric displaced fracture. IMPRESSION: Right femur comminuted 3-4 parts intertrochanteric fracture. PLAN:   I discussed the overall alignment of the fracture and his familywith the patient, and treatment options including both surgical and non-surgical treatment, and that my recommendation would be an open reduction and internal fixation with Gamma nail given the amount of displacement of the fracture. I discussed the risks and benefits of surgery with the patient, including but not limited to infection, bleeding, pain, injury to nerves or blood vessels failure of the surgery and need for additional surgery. All the patient's questions were answered. We discussed an expected post-operative course. He is understanding of this and wishes to proceed. Surgery today as he is medically stable. Thank you very much for the kind consultation and allowing me to participate in this patient's care. I will continue to keep you apprised of her progress.         Jayde Demarco MD   11/14/2021  12:25 PM

## 2021-11-14 NOTE — BRIEF OP NOTE
Brief Postoperative Note      Patient: Kristal Calle  YOB: 1924  MRN: [id number]    Date of Procedure: 11/14/2021    Pre-Op Diagnosis: RIGHT HIP COMMINUTED INTERTROCHANTERIC FRACTURE    Post-Op Diagnosis: Same       Procedure(s):  OPEN REDUCTION INTERNAL FIXATION RIGHT PROXIMAL FEMUR COMMINUTED FRACTURE WITH GAMMA NAIL    Surgeon(s):  Gabriel Silverman MD    Assistant:  Surgical Assistant: Darrel Bourne    Anesthesia: General    Estimated Blood Loss (mL): less than 50     Complications: None    Specimens:   * No specimens in log *    Implants:  Implant Name Type Inv.  Item Serial No.  Lot No. LRB No. Used Action   NAIL IM L420MM [id number] 125DEG LNG R TROCHANTERIC FEM TI ERIBERTO  NAIL IM L420MM [id number] 125DEG LNG R TROCHANTERIC FEM TI ERIBERTO  [employer] [id number] Right 1 Implanted   SCREW BNE L100MM DIA10.5MM TI U BLDE LAG ERIBERTO 3  SCREW BNE L100MM DIA10.5MM TI U BLDE LAG ERIBERTO 3  [employer] Stage[employer] [id number] Right 1 Implanted   SCREW LK 5X65MM  SCREW LK 5X65MM  [employer] [id number] Right 1 Implanted   SCREW LK 5X55MM  SCREW LK 5X55MM  [employer] Stage[employer] [id number] Right 1 Implanted         Drains: * No LDAs found *    Findings: SAME    Electronically signed by Gabriel Silverman MD on 11/14/2021 at 1:40 PM

## 2021-11-14 NOTE — CONSULTS
Department of Orthopedic Surgery  Physician Assistant   Consult Note        Reason for Consult:  Right hip pain  Requesting Physician: Rosamaria Bowling MD  Date of Service: 11/14/2021 9:52 AM    CHIEF COMPLAINT:  As Above    History Obtained From:  patient, family member - daughter in law at [facility]    HISTORY OF PRESENT ILLNESS:                The patient is a 80 y.o. male with a history of syncope, TIA, and afib on xarelto, who presents with above chief complaint. Pt woke up at 3am this morning to use the restroom and fell backwards, landing on his right side. He felt immediate pain in his hip. He lives with his son and daughter in law, who found him down on the ground and called 911. He denies any fever, chills, nausea. He denies hitting his head and denies LOC. His pain is increased with movement and tolerable at rest. He denies any numbness or tingling into his lower extremities. Past Medical History:        Diagnosis Date    CAD (coronary artery disease)     Hypertension      Past Surgical History:        Procedure Laterality Date    PACEMAKER INSERTION           Medications Prior to Admission:   Prior to Admission medications    Medication Sig Start Date End Date Taking? Authorizing Provider   docusate sodium (COLACE) 100 MG capsule Take 1 capsule by mouth 2 times daily 5/24/21   Julienne Rajan MD   atorvastatin (LIPITOR) 40 MG tablet Take 1 tablet by mouth nightly 9/26/20   DAVID Pool CNP   cyanocobalamin 1000 MCG/ML injection Inject 1,000 mcg into the muscle once Once monthly    Historical Provider, MD   aspirin 81 MG tablet Take 81 mg by mouth daily. Historical Provider, MD   labetalol (NORMODYNE) 300 MG tablet Take 300 mg by mouth 2 times daily. Historical Provider, MD   Multiple Vitamins-Minerals (MULTIVITAMIN PO) Take  by mouth daily. Historical Provider, MD   vitamin E 1000 UNITS capsule Take 1,000 Units by mouth daily.       Historical Provider, MD       Allergies: Patient has no known allergies. Social History:    Tobacco:  reports that he has never smoked. He has never used smokeless tobacco.   Alcohol:  reports previous alcohol use. Illicit Drug: No  Family History:   History reviewed. No pertinent family history. REVIEW OF SYSTEMS:    CONSTITUTIONAL:  negative  MUSCULOSKELETAL:  positive for  pain  All other systems reviewed and negative    PHYSICAL EXAM:    awake, alert, cooperative, no apparent distress, and appears stated age  MUSCULOSKELETAL:   Right Leg shortened and externally rotated  there is no redness, warmth, or swelling of the joints  full range of motion noted  with exception of    RIGHT HIP:  redness absent, warmth absent  swelling present to anterior hip and proximal thigh, tenderness present throughout anterior hip and posterolateral hip. Compartments soft and compressible. NVI to light touch, able to move ankle and toes vigorously. Feet warm and well perfused, DP/PT pulses unable to assess      DATA:    CBC:   Recent Labs     11/14/21  0555   WBC 3.8*   HGB 10.9*        BMP:    Recent Labs     11/14/21  0555      K 4.7      CO2 24   BUN 31*   CREATININE 1.4*   GLUCOSE 107*     INR:   Recent Labs     11/14/21  0555   INR 1.51*       Radiology:   CT HIP RIGHT WO CONTRAST   Final Result   Comminuted displaced intertrochanteric right femoral fracture. XR FEMUR RIGHT (MIN 2 VIEWS)   Final Result   Intertrochanteric right femur fracture         XR CHEST PORTABLE   Preliminary Result   New focal airspace opacity in left mid lung suspicious for pneumonia. There   is a persistent airspace opacity in the right mid lung not significantly   changed from the prior exam.      Questionable age-indeterminate right lateral 2nd rib fracture. Recommend   correlation with focal area of pain. An acute nondisplaced fracture is not   excluded given trauma. No pneumothorax.          XR PELVIS (1-2 VIEWS)   Preliminary Result   Acute comminuted mildly displaced intertrochanteric fracture of the right   proximal femur. IMPRESSION/RECOMMENDATIONS:    Assessment: four part intertrochanteric right hip fracture    Plan:  1) The case was discussed with the on-call surgeon, Dr Enedina Day. Additional imaging discussions were had with the orthopedic traumatologist Dr Rabia Hitchcock, who will be taking the case. OR today for ORIF of right hip with Dr Rabia Hitchcock pending cardiology and IM clearance, appreciate their input  2) Pt last dose of xarelto yesterday AM, hx of afib, waiting cardiology clearance. Hold hospital DVT prophylaxis- spole with VAN Arnold  3) Abx on call to OR, consent ordered  4) PT/OT eval post-op        Thank you for the opportunity to consult on this patient. Hayden Johnson      I have personally seen and examined this patient. The patient was seen for right hip fracture. I have fully participated in the care of this patient. I have reviewed and agree with all pertinent clinical information including history, physical exam, diagnostic testing and the plan by GITA Johnson.       Elma Toro MD 11/14/2021 12:35 PM

## 2021-11-14 NOTE — CONSULTS
[id number]    Fall  Hip fx  PAF  Chronic dCHF  HTN  HLD  H/O TIA  PPM    No cardiac contraindication to surgery

## 2021-11-14 NOTE — PROGRESS NOTES
OK to send patient to ER per Dr. Adia Prince. Patient awake and alert. States pain is ok if he lays still. Dr. Adia Prince aware and no orders obtained at this time.

## 2021-11-14 NOTE — PROGRESS NOTES
Report to WALT Joy RN. Patient awake and alert. No c/o pain. Family updated.   Patient to go back to ER and wait for admission bed per Clinical

## 2021-11-14 NOTE — PROGRESS NOTES
15:50 Pt to 2 west, pt awake a&o x4, VS stable, pt temp a little on the low side, warming blanket applied , bed alarm on for safety   15:59 Pt remains awake a&o x4, assessment as charted, bed alarm at all times for safety   19:00 Handoff report given to night nurse carolina/ veronica , pt awake a&o x4, pt stable @ handoff

## 2021-11-14 NOTE — ANESTHESIA PRE PROCEDURE
Department of Anesthesiology  Preprocedure Note       Name:  Rex Kitchen   Age:  80 y.o.  :  3/17/1924                                          MRN:  [id number]         Date:  2021      Surgeon: Osiris Daniel):  Jayde Demarco MD    Procedure: Procedure(s):  RIGHT HIP OPEN REDUCTION INTERNAL FIXATION GAMMA NAIL    Medications prior to admission:   Prior to Admission medications    Medication Sig Start Date End Date Taking? Authorizing Provider   rivaroxaban (XARELTO) 15 MG TABS tablet Take 15 mg by mouth daily (with breakfast)   Yes Historical Provider, MD   docusate sodium (COLACE) 100 MG capsule Take 1 capsule by mouth 2 times daily 21   Nicol Hanson MD   atorvastatin (LIPITOR) 40 MG tablet Take 1 tablet by mouth nightly 20   DAVID Florence CNP   cyanocobalamin 1000 MCG/ML injection Inject 1,000 mcg into the muscle once Once monthly    Historical Provider, MD   aspirin 81 MG tablet Take 81 mg by mouth daily. Historical Provider, MD   labetalol (NORMODYNE) 300 MG tablet Take 300 mg by mouth 2 times daily. Historical Provider, MD   Multiple Vitamins-Minerals (MULTIVITAMIN PO) Take  by mouth daily. Historical Provider, MD   vitamin E 1000 UNITS capsule Take 1,000 Units by mouth daily.       Historical Provider, MD       Current medications:    Current Facility-Administered Medications   Medication Dose Route Frequency Provider Last Rate Last Admin    aspirin chewable tablet 81 mg  81 mg Oral Daily DAVID Cole CNP        atorvastatin (LIPITOR) tablet 40 mg  40 mg Oral Nightly DAVID Cole CNP        docusate sodium (COLACE) capsule 100 mg  100 mg Oral BID DAVID Cole CNP   100 mg at 21 0949    labetalol (NORMODYNE) tablet 300 mg  300 mg Oral BID DAVID Cole CNP   300 mg at 21 1038    sodium chloride flush 0.9 % injection 5-40 mL  5-40 mL IntraVENous 2 times per day DAVID Cole CNP        sodium chloride flush 0.9 % injection 5-40 mL  5-40 mL IntraVENous PRN Grimes Cook, APRN - CNP        0.9 % sodium chloride infusion  25 mL IntraVENous PRN Grimes Cook, APRN - CNP        acetaminophen (TYLENOL) tablet 650 mg  650 mg Oral Q6H PRN Grimes Cook, APRN - CNP        Or    acetaminophen (TYLENOL) suppository 650 mg  650 mg Rectal Q6H PRN Grimes Cook, APRN - CNP        ondansetron (ZOFRAN-ODT) disintegrating tablet 4 mg  4 mg Oral Q8H PRN Grimes Cook, APRN - CNP   4 mg at 11/14/21 0949    Or    ondansetron (ZOFRAN) injection 4 mg  4 mg IntraVENous Q6H PRN Grimes Cook, APRN - CNP        polyethylene glycol (GLYCOLAX) packet 17 g  17 g Oral Daily PRN Grimes Cook, APRN - CNP        morphine (PF) injection 2 mg  2 mg IntraVENous Q4H PRN Grimes Cook, APRN - CNP   2 mg at 11/14/21 0949    0.9 % sodium chloride infusion   IntraVENous Continuous Grimes Liam, APRN -  mL/hr at 11/14/21 0953 New Bag at 11/14/21 0953    ceFAZolin (ANCEF) 2000 mg in sterile water 20 mL IV syringe  2,000 mg IntraVENous On Call to [address] [place] [address]        oxyCODONE (ROXICODONE) immediate release tablet 5 mg  5 mg Oral Q4H PRN GITA Roberts   5 mg at 11/14/21 0855    Or    oxyCODONE (ROXICODONE) immediate release tablet 10 mg  10 mg Oral Q4H PRN GITA Roberts         Current Outpatient Medications   Medication Sig Dispense Refill    rivaroxaban (XARELTO) 15 MG TABS tablet Take 15 mg by mouth daily (with breakfast)      docusate sodium (COLACE) 100 MG capsule Take 1 capsule by mouth 2 times daily 30 capsule 0    atorvastatin (LIPITOR) 40 MG tablet Take 1 tablet by mouth nightly 30 tablet 3    cyanocobalamin 1000 MCG/ML injection Inject 1,000 mcg into the muscle once Once monthly      aspirin 81 MG tablet Take 81 mg by mouth daily.  labetalol (NORMODYNE) 300 MG tablet Take 300 mg by mouth 2 times daily.  Multiple Vitamins-Minerals (MULTIVITAMIN PO) Take  by mouth daily.         vitamin E 1000 UNITS capsule Take 1,000 Units by mouth daily. Allergies:  No Known Allergies    Problem List:    Patient Active Problem List   Diagnosis Code    TIA (transient ischemic attack) G45.9    Pacemaker Z95.0    Sinoatrial node dysfunction (HCC) I49.5    HTN (hypertension), benign I10    Altered mental status R41.82    Community acquired pneumonia of right middle lobe of lung J18.9    Fall W19. XXXA    Closed displaced intertrochanteric fracture of right femur ([facility] S72.141A    PEDRO (acute kidney injury) ([facility] N17.9    PAF (paroxysmal atrial fibrillation) ([facility]) I48.0       Past Medical History:        Diagnosis Date    CAD (coronary artery disease)     Hypertension        Past Surgical History:        Procedure Laterality Date    PACEMAKER INSERTION         Social History:    Social History     Tobacco Use    Smoking status: Never Smoker    Smokeless tobacco: Never Used   Substance Use Topics    Alcohol use: Not Currently     Comment: ONCE A YEAR                                Counseling given: Not Answered      Vital Signs (Current):   Vitals:    11/14/21 1000 11/14/21 1030 11/14/21 1100 11/14/21 1204   BP: (!) 119/55 (!) 138/59 (!) 127/53 (!) 145/53   Pulse: 60 62 80 71   Resp: 16 17 9 17   Temp:    98 °F (36.7 °C)   TempSrc:    Oral   SpO2: 97% 96% 97% 97%   Weight:       Height:                                                  BP Readings from Last 3 Encounters:   11/14/21 (!) 145/53   06/19/21 (!) 148/61   05/24/21 (!) 176/66       NPO Status: Time of last liquid consumption: 0000                        Time of last solid consumption: 1800                        Date of last liquid consumption: 11/14/21                        Date of last solid food consumption: 11/13/21    BMI:   Wt Readings from Last 3 Encounters:   11/14/21 165 lb (74.8 kg)   06/19/21 160 lb (72.6 kg)   05/24/21 165 lb (74.8 kg)     Body mass index is 22.38 kg/m².     CBC:   Lab Results   Component Value Date    WBC 3.8 11/14/2021    RBC 2.88 11/14/2021 HGB 10.9 11/14/2021    HCT 32.1 11/14/2021    .7 11/14/2021    RDW 15.8 11/14/2021     11/14/2021       CMP:   Lab Results   Component Value Date     11/14/2021    K 4.7 11/14/2021     11/14/2021    CO2 24 11/14/2021    BUN 31 11/14/2021    CREATININE 1.4 11/14/2021    GFRAA 57 11/14/2021    GFRAA >60 11/30/2012    AGRATIO 1.2 11/14/2021    LABGLOM 47 11/14/2021    GLUCOSE 107 11/14/2021    PROT 6.3 11/14/2021    CALCIUM 8.8 11/14/2021    BILITOT 0.4 11/14/2021    ALKPHOS 77 11/14/2021    AST 16 11/14/2021    ALT 9 11/14/2021       POC Tests: No results for input(s): POCGLU, POCNA, POCK, POCCL, POCBUN, POCHEMO, POCHCT in the last 72 hours. Coags:   Lab Results   Component Value Date    PROTIME 17.4 11/14/2021    INR 1.51 11/14/2021    APTT 32.7 11/30/2012       HCG (If Applicable): No results found for: PREGTESTUR, PREGSERUM, HCG, HCGQUANT     ABGs: No results found for: PHART, PO2ART, IWC9IFV, ZNC0ZSQ, BEART, V2XQETGY     Type & Screen (If Applicable):  No results found for: LABABO, LABRH    Drug/Infectious Status (If Applicable):  No results found for: HIV, HEPCAB    COVID-19 Screening (If Applicable):   Lab Results   Component Value Date    COVID19 NOT DETECTED 11/14/2021           Anesthesia Evaluation  Patient summary reviewed and Nursing notes reviewed no history of anesthetic complications:   Airway: Mallampati: II  TM distance: >3 FB   Neck ROM: full  Mouth opening: > = 3 FB Dental: normal exam         Pulmonary:   (+) pneumonia:                             Cardiovascular:    (+) hypertension:, pacemaker: pacemaker, CAD:, dysrhythmias: atrial fibrillation,                   Neuro/Psych:   (+) TIA,             GI/Hepatic/Renal: Neg GI/Hepatic/Renal ROS       (-) GERD, liver disease and no renal disease       Endo/Other: Negative Endo/Other ROS       (-) diabetes mellitus               Abdominal:             Vascular: negative vascular ROS.          Other Findings: Anesthesia Plan      general     ASA 3     (I discussed with the patient the risks and benefits of PIV, general anesthesia, IV Narcotics, PACU. All questions were answered the patient agrees with the plan)  Induction: intravenous. MIPS: Prophylactic antiemetics administered. Anesthetic plan and risks discussed with patient and child/children.                     Manuel Anand MD   11/14/2021

## 2021-11-14 NOTE — CONSULTS
[address]                 [address] [address]                                  CONSULTATION    PATIENT NAME: Olvin Topete                       :        1924  MED REC NO:   [id number]                          ROOM:       07  ACCOUNT NO:   [de-identified]                           ADMIT DATE: 2021  PROVIDER:     Silvia Pittman MD    DATE OF CONSULTATION:  2021     REASON FOR CONSULTATION:  Preoperative cardiovascular risk assessment. HISTORY OF PRESENT ILLNESS:  A 41-year-old male who presented to the  hospital after a fall. He describes standing, looking out of the window  when he lost his balance, fell backwards. He had associated pain in his  hip immediately after he fell. He has had falls in the past.  There  were no precipitating events. It was sudden. No loss of consciousness. No dizziness or lightheadedness. He does not have any recurrence. He  denies any recent chest pain, shortness of breath, palpitations,  dizziness, lightheadedness, or syncope. PAST MEDICAL HISTORY:  1. Permanent pacemaker, followed by [facility]. 2.  Paroxysmal atrial fibrillation. 3.  Chronic diastolic congestive heart failure. 4.  Hypertension. 5.  Hyperlipidemia. FAMILY HISTORY:  Positive for heart disease. SOCIAL HISTORY:  Does not smoke. ALLERGIES:  No known drug allergies. MEDICATIONS:  See list in the chart, which I have reviewed. REVIEW OF SYSTEMS:  No fevers or chills. No cough. No focal neurologic  symptoms. No headache or visual changes. No recent GI or  bleeding. No recent or upcoming surgeries. All other systems are negative except  as present illness. PHYSICAL EXAMINATION:  VITAL SIGNS:  Blood pressure is 127/53, heart rate 80, respirations 17,  temperature 97.6. He is 97% saturating on room air. GENERAL:  Well-developed, well-nourished white male, in no acute  distress.   HEENT: Normocephalic, atraumatic. Oropharynx clear. Moist mucous  membranes. NECK:  Supple. CHEST:  Clear. CARDIAC:  Regular S1, S2. There is no S3 or S4 gallop. There is no  significant murmur. Jugular venous pressure is normal.  Carotids are 2+  and symmetric without bruit. ABDOMEN:  Soft, nontender. Positive bowel sounds. EXTREMITIES:  No cyanosis or edema. NEUROLOGIC:  Grossly nonfocal.  SKIN:  Warm and dry. PSYCHIATRIC:  Affect calm. LABORATORY DATA AND DIAGNOSTIC STUDIES:  EKG, AV paced, component less  than 0.01. Chest x-ray, no pulmonary edema. Significant laboratory  data includes a COVID PCR test negative. Hemoglobin of 10.9. Creatinine is 1.4. Telemetry, ventricular paced. IMPRESSION:  1. Fall due to disequilibrium. 2.  Hip fracture. 3.  Paroxysmal atrial fibrillation. 4.  Chronic diastolic congestive heart failure, currently compensated. 5.  Hypertension, stable. 6.  Hyperlipidemia. 7.  History of TIA. 8.  Permanent pacemaker. RECOMMENDATIONS:  1. At this time, there is no cardiac contraindication to surgery. 2.  He is elevated risk due to his advanced age and comorbidities. 3.  Notes that he has increased bleeding risk due to chronic  anticoagulation and took his Xarelto in the last 24 hours.         William Greenfield MD    D: 11/14/2021 11:34:47       T: 11/14/2021 13:15:09     [email or web address]  Job#: [id number]     Doc#: [id number]    CC:

## 2021-11-14 NOTE — ANESTHESIA POSTPROCEDURE EVALUATION
Department of Anesthesiology  Postprocedure Note    Patient: Rachelle Samayoa  MRN: [id number]  YOB: 1924  Date of evaluation: 11/14/2021  Time:  1:58 PM     Procedure Summary     Date: 11/14/21 Room / Location: [address] [place] [address] / [facility]    Anesthesia Start: 9645 Anesthesia Stop: 4246    Procedure: OPEN REDUCTION INTERNAL FIXATION RIGHT PROXIMAL FEMUR COMMINUTED FRACTURE WITH GAMMA NAIL (Right ) Diagnosis: (RIGHT HIP FRACTURE)    Surgeons: Erik Mcahado MD Responsible Provider: Ángel Be MD    Anesthesia Type: general ASA Status: 3          Anesthesia Type: general    Parker Phase I: Parker Score: 8    Parker Phase II:      Last vitals: Reviewed and per EMR flowsheets.        Anesthesia Post Evaluation    Patient location during evaluation: PACU  Level of consciousness: awake  Airway patency: patent  Nausea & Vomiting: no nausea  Complications: no  Cardiovascular status: blood pressure returned to baseline  Respiratory status: acceptable  Hydration status: euvolemic

## 2021-11-14 NOTE — ED NOTES
Pt arrived to ER Room #6 from surgery via transportation. Pt on O2 2L via NC. Pt denies needs. VS stable.   Received report from PACU RN Lesa Sever, RN  11/14/21 2452

## 2021-11-14 NOTE — H&P
Hospital Medicine History & Physical      PCP: DAVID Briscoe - CNP    Date of Admission: 11/14/2021    Date of Service: Pt seen/examined on 11/14/21      Chief Complaint:    Chief Complaint   Patient presents with    Hip Injury     Patient arrived via EMS. Per EMS, patient was on his way to bathroom and his feet got hung up and he fell, landed on his right hip. Denies hitting head, LOC, and being on blood thinners. History Of Present Illness: The patient is a 80 y.o. male with PMH of CAD, PAF on Xarelto at home, sinoatrial node dysfunction, status post Medtronic dual-chamber pacemaker placed in September 2002 he [facility] with the [facility] for his cardiology care, TIA, diastolic congestive heart failure, and hypertension who presents to [facility] with  History obtained from the patient and review of EMR. Patient woke up this morning at 3 AM to use the bathroom fell back landing on his right side with immediate right hip pain. He was found by his son and daughter-in-law and EMS was called. Patient resting in bed with daughter-in-law at bedside. He said over the last 6 months he stated he has noticed some ongoing generalized weakness. He uses his rolling walker with ambulation. He stated he went to get up today to use the bathroom and lost his balance and fell. He denies any symptoms other than generalized weakness prior to the fall. Denies hitting his head or loss of consciousness with this fall. denies any chest pain, palpitations, shortness of breath, dizziness, lightheadedness, cough cold or congestion. Planned anesthesia: yes  Known anesthesia problems: no  Bleeding risk:  Yes on Xarelto, last dose was yesterday am   Personal or FH of DVT/PE:  no  Patient objection to receiving blood products: no    In ER, x-ray of pelvis showed an acute commuted mildly displaced intertochanteric fx of right proximal femur.  CT of right hip completed which showed intertrochanteric right femur fx. Troponin <0.01. Per ER EKG changes noted. Unable to see EKG, pt noted with elevated creatinine 1.4. Ortho consulted and would like to take patient to surgery today at 1200. Cardiology ws consulted for medical clearance. Past Medical History:        Diagnosis Date    CAD (coronary artery disease)     Hypertension        Past Surgical History:        Procedure Laterality Date    PACEMAKER INSERTION         Medications Prior to Admission:    Prior to Admission medications    Medication Sig Start Date End Date Taking? Authorizing Provider   docusate sodium (COLACE) 100 MG capsule Take 1 capsule by mouth 2 times daily 5/24/21   Julienne Rajan MD   atorvastatin (LIPITOR) 40 MG tablet Take 1 tablet by mouth nightly 9/26/20   DAVID Pool - CNP   cyanocobalamin 1000 MCG/ML injection Inject 1,000 mcg into the muscle once Once monthly    Historical Provider, MD   aspirin 81 MG tablet Take 81 mg by mouth daily. Historical Provider, MD   labetalol (NORMODYNE) 300 MG tablet Take 300 mg by mouth 2 times daily. Historical Provider, MD   Multiple Vitamins-Minerals (MULTIVITAMIN PO) Take  by mouth daily. Historical Provider, MD   vitamin E 1000 UNITS capsule Take 1,000 Units by mouth daily. Historical Provider, MD       Allergies:  Patient has no known allergies. Social History:  The patient currently lives son     TOBACCO:   reports that he has never smoked. He has never used smokeless tobacco.  ETOH:   reports previous alcohol use. Family History:   Positive as follows:    History reviewed. No pertinent family history.     REVIEW OF SYSTEMS:     Constitutional: Negative for fever   HENT: Negative for sore throat   Eyes: Negative for redness   Respiratory: Negative  for dyspnea, cough   Cardiovascular: Negative for chest pain   Gastrointestinal: Negative for vomiting, diarrhea   Genitourinary: Negative for hematuria   Musculoskeletal: + right hip pain   Skin: Negative for rash +scattered ecchymosis   Neurological: Negative for syncope   Hematological: Negative for adenopathy   Psychiatric/Behavorial: Negative for anxiety    PHYSICAL EXAM:    BP (!) 119/55   Pulse 60   Temp 97.6 °F (36.4 °C) (Oral)   Resp 16   Ht 6' (1.829 m)   Wt 165 lb (74.8 kg)   SpO2 97%   BMI 22.38 kg/m²     Gen: No distress. Alert. Eyes: PERRL. No sclera icterus. No conjunctival injection. ENT: No discharge. Pharynx clear. Neck: No JVD. Trachea midline. Resp: No accessory muscle use. No crackles. No wheezes. No rhonchi. CV: Regular rate. Regular rhythm. No murmur. No rub. No edema. GI: Non-tender. Non-distended. No masses. No organomegaly. Normal bowel sounds. No hernia. Skin: Warm and dry. No nodule on exposed extremities. No rash on exposed extremities. +BUE ecchymosis   M/S: No cyanosis. +right leg externally rotated, +shorter than left. +edmea and TTP to right him + pedal pulses, warm to touch  Neuro: Awake. Grossly nonfocal    Psych: Oriented x 3. No anxiety or agitation. CBC:   Recent Labs     11/14/21  0555   WBC 3.8*   HGB 10.9*   HCT 32.1*   .7*        BMP:   Recent Labs     11/14/21  0555      K 4.7      CO2 24   BUN 31*   CREATININE 1.4*     LIVER PROFILE:   Recent Labs     11/14/21  0555   AST 16   ALT 9*   BILITOT 0.4   ALKPHOS 77     PT/INR:   Recent Labs     11/14/21  0555   PROTIME 17.4*   INR 1.51*         CARDIAC ENZYMES  Recent Labs     11/14/21  0555   TROPONINI <0.01       CULTURES  SARS-CoV-2 RNA, RT PCR NOT DETECTED      INFLUENZA A NOT DETECTED    INFLUENZA B NOT DETECTED       EKG:   Reviewed with Leroy Cross 67   Final Result   Comminuted displaced intertrochanteric right femoral fracture.          XR FEMUR RIGHT (MIN 2 VIEWS)   Final Result   Intertrochanteric right femur fracture         XR CHEST PORTABLE   Preliminary Result   New focal airspace opacity in left mid lung suspicious for pneumonia. There   is a persistent airspace opacity in the right mid lung not significantly   changed from the prior exam.      Questionable age-indeterminate right lateral 2nd rib fracture. Recommend   correlation with focal area of pain. An acute nondisplaced fracture is not   excluded given trauma. No pneumothorax. XR PELVIS (1-2 VIEWS)   Preliminary Result   Acute comminuted mildly displaced intertrochanteric fracture of the right   proximal femur. Active Problems:    Fall at home, initial encounter  Resolved Problems:    * No resolved hospital problems.  *        ASSESSMENT/PLAN:  Fall  Hip fracture  - ortho consulted and plans to take patient to surgery today, if medically cleared  - cardiology consulted for clearance due to EKG changes and cardiac history  - spoke with Dr. Hua Cox and ok from cardiac standpoint for surgery  - discussed plan of care with Dr. Rivera and from IM standpoint, medically cleared  - Plan is for ORIF of right hip with Dr. Cherylene Highland today  - will admit to PCU after surgery with telemetry monitoring    Elevated creatinine  - baseline 1-1.2  - 1.4 on admission   - IVF at 100 ml  - repeat BMP tomorrow am     Abnormal CXR  - new focal airspace opacity LML, suspicious for pneumonia  - pt on room air, denies any shortness of breath, no cough, and afebrile  - WBC 3.9-- appears to be chronically on the lower side  - add procal   - monitor     Right lateral 2nd rib fracture  - age- indeterminate right lateral 2nd rib fracture, possible acute nondisplaced fracture is not excluded given trauma, no pneumothorax  - pt denies any pain or discomfort   - monitor     CAD  - pt with possible EKG changes, EKG reviewed with cardiology   - troponin < 0.01 x3  - denies any chest pain  - continue ASA, BB, and statin     Sinoatrial node dysfunction  Dual chamber PPM (9/2002) - Medtronic dual-chamber   - follows with [facility]   - Cardiology consulted and ok to for surgery today    PAF - on Xarelto at home  - last dose given yesterday  - can resume with ok from a othro standpoint    Macrocytic Anemia  - on B12 injections  - stable, monitor     Chronic dCHF  - appears compensated   - monitor with IVF at 100 ml/hr  - continue daily weights, low sodium diet, strict O/O    HTN  - controlled continue home meds: labetalol     H/O TIA    Plan of care discussed with Dr. Lan Robins    DVT Prophylaxis: Xarelto   Diet: Diet NPO Exceptions are: Sips of Water with Meds  Code Status: Full Code     DAVID Kowalski - CNP

## 2021-11-14 NOTE — ED NOTES
Assisted patient with urinal.  250ml output. Ricardo Aparicio with Ortho at bedside for evaluation.      Michelle Browne RN  11/14/21 [facility]  11/14/21 [id number]

## 2021-11-15 NOTE — PROGRESS NOTES
Notified of patients low blood pressure. Writer notified attending physician. Ordered to give 500 ml bolus.

## 2021-11-15 NOTE — PROGRESS NOTES
Notified son Norah Lynn r/t [employer]. Son states \" I am on the way. \" Notified Clinical/Code Team in room.

## 2021-11-15 NOTE — PROGRESS NOTES
Department of Orthopedic Surgery  Physician Assistant   Progress Note    Subjective:       Systemic or Specific Complaints:Pain Control    Objective:     Patient Vitals for the past 24 hrs:   BP Temp Temp src Pulse Resp SpO2 Weight   11/15/21 1138 (!) 101/58         11/15/21 0950 (!) 74/42         11/15/21 0745 (!) 85/49 98.6 °F (37 °C) Axillary 78 18 95 %    11/15/21 0400 (!) 100/53 98.5 °F (36.9 °C) Axillary 80 16 94 %    11/15/21 0100 (!) 102/52 98.8 °F (37.1 °C) Axillary 78 16 94 %    11/15/21 0034       184 lb 6.4 oz (83.6 kg)   11/14/21 2345 (!) 95/49 99.3 °F (37.4 °C) Oral 64 16 94 %    11/14/21 2015 (!) 100/53 95.6 °F (35.3 °C) Oral 62 16     11/14/21 1827  97.7 °F (36.5 °C) Axillary       11/14/21 1559 (!) 110/48 96.8 °F (36 °C) Oral 63 16 96 %    11/14/21 1555 (!) 110/48 97.7 °F (36.5 °C) Oral 62 15 100 %    11/14/21 1500 (!) 122/51   63 21 100 %    11/14/21 1441 (!) 115/52 97.7 °F (36.5 °C) Oral 61 22 100 %        General: alert, appears stated age and cooperative   Wound: Wound clean and dry no evidence of infection. , Wound Intact and Positive for Edema   Motion: Painful range of Motion in affected extremity   DVT Exam: No evidence of DVT seen on physical exam.     Additional exam: nvi    Data Review  CBC:   Lab Results   Component Value Date    WBC 6.0 11/15/2021    RBC 2.04 11/15/2021    HGB 7.7 11/15/2021    HCT 23.2 11/15/2021     11/15/2021       Renal:   Lab Results   Component Value Date     11/15/2021    K 5.2 11/15/2021     11/15/2021    CO2 18 11/15/2021    BUN 43 11/15/2021    CREATININE 2.4 11/15/2021    GLUCOSE 139 11/15/2021    CALCIUM 8.0 11/15/2021            Assessment:      right hip IM nail. Plan:      1:  PT/OT as able. Ice to thigh for swelling. Continue IM recs for PEDRO and hypotension. Acute blood loss anemia - expected after surgery. Will monitor Hgb.   LIkely will need SNF.  2:  Continue Deep venous thrombosis prophylaxis  3:  Continue Pain Control    Hayden Kong

## 2021-11-15 NOTE — ED PROVIDER NOTES
[facility] emergency department    CHIEF COMPLAINT  Fall with hip pain      HISTORY OF PRESENT ILLNESS  Richard Arboleda is a 80 y.o. male with a past medical history of coronary artery disease, pacemaker, and hypertension, who presents to the ED complaining of fall with hip injury. Mechanism of injury: At approximately 5:30 AM, patient was heading to the bathroom. He had a mechanical fall and landed on his right hip. He denies any other injury. He denies head injury, loss of consciousness, blood thinner use, or vomiting. He is on aspirin. He denies pain anywhere other than his right hip. Patient reports only mild pain when he is at rest, significant worsening of pain with palpation or movement. Patient did not receive anything prior to arrival.  He denies any numbness, weakness, tingling. Old records reviewed: No pertinent information noted. No other complaints, modifying factors or associated symptoms. I have reviewed the following from the nursing documentation. Past Medical History:   Diagnosis Date    CAD (coronary artery disease)     Hypertension      Past Surgical History:   Procedure Laterality Date    OTHER SURGICAL HISTORY  11/14/2021    OPEN REDUCTION INTERNAL FIXATION RIGHT PROXIMAL FEMUR COMMINUTED FRACTURE WITH GAMMA NAIL (Right )    PACEMAKER INSERTION       History reviewed. No pertinent family history. Social History     Socioeconomic History    Marital status:       Spouse name: Not on file    Number of children: Not on file    Years of education: Not on file    Highest education level: Not on file   Occupational History    Not on file   Tobacco Use    Smoking status: Never Smoker    Smokeless tobacco: Never Used   Vaping Use    Vaping Use: Never used   Substance and Sexual Activity    Alcohol use: Not Currently     Comment: ONCE A YEAR    Drug use: No    Sexual activity: Not on file   Other Topics Concern    Not on file   Social History Narrative    Jonnie Muir MD        ondansetron (ZOFRAN-ODT) disintegrating tablet 4 mg  4 mg Oral Q8H PRN Joleen Ambrose MD   4 mg at 11/14/21 0291    Or    ondansetron (ZOFRAN) injection 4 mg  4 mg IntraVENous Q6H PRN Joleen Ambrose MD        polyethylene glycol (GLYCOLAX) packet 17 g  17 g Oral Daily PRN Joleen Ambrose MD        morphine (PF) injection 2 mg  2 mg IntraVENous Q4H PRN Joleen Ambrose MD   2 mg at 11/14/21 2030    oxyCODONE (ROXICODONE) immediate release tablet 5 mg  5 mg Oral Q4H PRN Joleen Ambrose MD   5 mg at 11/14/21 8169    Or    oxyCODONE (ROXICODONE) immediate release tablet 10 mg  10 mg Oral Q4H PRN Joleen Ambrose MD   10 mg at 11/14/21 1914    [Held by provider] rivaroxaban (XARELTO) tablet 15 mg  15 mg Oral Daily with breakfast DAVID Grove - CNP        0.45 % sodium chloride infusion   IntraVENous Continuous Joleen Ambrose MD 75 mL/hr at 11/15/21 0223 Rate Verify at 11/15/21 0223    sodium chloride flush 0.9 % injection 5-40 mL  5-40 mL IntraVENous 2 times per day Joleen Ambrose MD   10 mL at 11/14/21 2030    sodium chloride flush 0.9 % injection 5-40 mL  5-40 mL IntraVENous PRN Joleen Ambrose MD        0.9 % sodium chloride infusion  25 mL IntraVENous PRN Joleen Ambrose MD        ceFAZolin (ANCEF) 2000 mg in sterile water 20 mL IV syringe  2,000 mg IntraVENous Q8H Joleen Ambrose MD   2,000 mg at 11/14/21 2030     No Known Allergies    REVIEW OF SYSTEMS  All systems reviewed, pertinent positives per HPI otherwise noted to be negative. PHYSICAL EXAM  GENERAL APPEARANCE: Peggy Venegas is in no acute respiratory distress. Awake and alert. Answers questions appropriately. VITAL SIGNS: BP (!) 143/57   Pulse 64   Temp 97.6 °F (oral)   Resp 18   Ht 6' (1.829 m)   Wt 184 lb 6.4 oz (83.6 kg)   SpO2 100%   BMI 25.01 kg/m²   HEENT: Normocephalic, atraumatic. No Richeys sign or Raccoon Eyes. No depressed skull fractures. Extraocular muscles are intact. Pupils equal round and reactive to light. Conjunctivas are pink. Negative scleral icterus. No epistaxis. No septal hematomas. No hemotympanum. Mucous membranes are moist. Tongue in the midline. Pharynx without erythema or exudates. No uvular deviation. NECK: Nontender and supple. No stridor or meningismus. Trachea in the midine. Cervical spine is non-tender to palpation in the midline. No abrasions. CHEST: Nontender to palpation. Clear to auscultation bilaterally. No rales, rhonchi, or wheezing. No abrasions. HEART: Regular rate and rhythm. No murmurs, gallops or rubs. Strong and equal pulses in the upper and lower extremities. ABDOMEN: Soft, nontender, nondistended, positive bowel sounds, no palpable pulsatile masses. No abrasions. MUSCULOSKELETAL: Cervical, thoracic, and lumbosacral spines are non-tender to palpation. Theres no edema, bruising, or step-offs. Right femur tender to palpation. Right leg is shortened and externally rotated. Right lower extremity is neurovascularly intact. Patient is able to move his ankle. Otherwise upper and lower extremities have no deformities and they are non-tender to palpation. The calves are nontender to palpation. Active range of motion. NEUROLOGICAL: Awake, alert and oriented x 3. Power intact in the upper and lower extremities. Sensation is intact to light touch in the upper and lower extremities. GCS 15. IMMUNOLOGICAL: No palpable lymphadenopathy or lymphatic streaking. DERMATOLOGIC: No petechiae, rashes, or ecchymoses. No lacerations or abrasions. LABS  I have reviewed all labs for this visit.    Results for orders placed or performed during the hospital encounter of 11/14/21   COVID-19 & Influenza Combo    Specimen: Nasopharyngeal Swab   Result Value Ref Range    SARS-CoV-2 RNA, RT PCR NOT DETECTED NOT DETECTED    INFLUENZA A NOT DETECTED NOT DETECTED    INFLUENZA B NOT DETECTED NOT DETECTED   CBC Auto Differential   Result Value Ref Range    WBC 3.8 (L) 4.0 - 11.0 K/uL    RBC 2.88 (L) 4.20 - 5.90 M/uL    Hemoglobin 10.9 (L) 13.5 - 17.5 g/dL    Hematocrit 32.1 (L) 40.5 - 52.5 %    .7 (H) 80.0 - 100.0 fL    MCH 38.0 (H) 26.0 - 34.0 pg    MCHC 34.0 31.0 - 36.0 g/dL    RDW 15.8 (H) 12.4 - 15.4 %    Platelets 056 145 - 005 K/uL    MPV 7.4 5.0 - 10.5 fL    PLATELET SLIDE REVIEW Adequate     SLIDE REVIEW see below     Path Consult Yes     Neutrophils % 67.2 %    Lymphocytes % 13.2 %    Monocytes % 8.9 %    Eosinophils % 10.0 %    Basophils % 0.7 %    Neutrophils Absolute 2.6 1.7 - 7.7 K/uL    Lymphocytes Absolute 0.5 (L) 1.0 - 5.1 K/uL    Monocytes Absolute 0.3 0.0 - 1.3 K/uL    Eosinophils Absolute 0.4 0.0 - 0.6 K/uL    Basophils Absolute 0.0 0.0 - 0.2 K/uL    Anisocytosis Occasional (A)     Polychromasia Occasional (A)     Hypochromia Occasional (A)     Poikilocytes Occasional (A)    Comprehensive Metabolic Panel w/ Reflex to MG   Result Value Ref Range    Sodium 138 136 - 145 mmol/L    Potassium reflex Magnesium 4.7 3.5 - 5.1 mmol/L    Chloride 104 99 - 110 mmol/L    CO2 24 21 - 32 mmol/L    Anion Gap 10 3 - 16    Glucose 107 (H) 70 - 99 mg/dL    BUN 31 (H) 7 - 20 mg/dL    CREATININE 1.4 (H) 0.8 - 1.3 mg/dL    GFR Non- 47 (A) >60    GFR  57 (A) >60    Calcium 8.8 8.3 - 10.6 mg/dL    Total Protein 6.3 (L) 6.4 - 8.2 g/dL    Albumin 3.4 3.4 - 5.0 g/dL    Albumin/Globulin Ratio 1.2 1.1 - 2.2    Total Bilirubin 0.4 0.0 - 1.0 mg/dL    Alkaline Phosphatase 77 40 - 129 U/L    ALT 9 (L) 10 - 40 U/L    AST 16 15 - 37 U/L   Protime-INR   Result Value Ref Range    Protime 17.4 (H) 9.9 - 12.7 sec    INR 1.51 (H) 0.88 - 1.12   Procalcitonin   Result Value Ref Range    Procalcitonin 0.07 0.00 - 0.15 ng/mL   Troponin   Result Value Ref Range    Troponin <0.01 <0.01 ng/mL   Troponin   Result Value Ref Range    Troponin <0.01 <0.01 ng/mL   Procalcitonin   Result Value Ref Range    Procalcitonin 0.07 0.00 - 0.15 ng/mL   EKG 12 Lead   Result Value Ref Range    Ventricular Rate 69 BPM Atrial Rate 64 BPM    QRS Duration 146 ms    Q-T Interval 476 ms    QTc Calculation (Bazett) 510 ms    R Axis -77 degrees    T Axis 92 degrees    Diagnosis       AV sequential or dual chamber electronic pacemakerConfirmed by Crista Gunn MD, Bee Antoine ([id number]) on 11/14/2021 11:55:20 AM   TYPE AND SCREEN   Result Value Ref Range    ABO/Rh O POS     Antibody Screen NEG          ECG  The Ekg interpreted by me shows  normal pacemaker rhythm with a rate of 69  Axis is   Left axis deviation  QTc is  prolonged  Intervals and Durations are unremarkable. ST Segments: nonspecific changes  Significant change from prior EKG dated 6/19/21      RADIOLOGY    CT HIP RIGHT WO CONTRAST   Final Result   Comminuted displaced intertrochanteric right femoral fracture. XR FEMUR RIGHT (MIN 2 VIEWS)   Final Result   Intertrochanteric right femur fracture         XR CHEST PORTABLE   Preliminary Result   New focal airspace opacity in left mid lung suspicious for pneumonia. There   is a persistent airspace opacity in the right mid lung not significantly   changed from the prior exam.      Questionable age-indeterminate right lateral 2nd rib fracture. Recommend   correlation with focal area of pain. An acute nondisplaced fracture is not   excluded given trauma. No pneumothorax. XR PELVIS (1-2 VIEWS)   Preliminary Result   Acute comminuted mildly displaced intertrochanteric fracture of the right   proximal femur. ED COURSE / MDM  Patient seen and evaluated. Old records reviewed and pertinent information included in HPI. Labs and imaging reviewed and results discussed with patient. Overall well appearing patient, in no acute distress, presenting for mechanical fall with right hip pain. Physical exam remarkable for right lower extremity shortened and externally rotated and tender to palpation, significant concern for hip fracture.   No other traumatic injuries identified    Differential diagnosis includes but is not limited to: Hip fracture, lower suspicion for pelvic fracture, intracranial trauma, spinal injury    Workup showed:  CXR: IMPRESSION:  New focal airspace opacity in left mid lung suspicious for pneumonia. There  is persistent airspace opacity in the right mid lung not significantly  changed from the prior exam.     Questionable age-indeterminate right lateral 2nd rib fracture. Recommend  correlation focal area of pain is an acute nondisplaced fracture is not  excluded given trauma. No pneumothorax.  ---------------------  Patient does not have any tenderness to palpation of his right ribs. Low suspicion for rib fracture. [ER]     XR Pelvis: IMPRESSION:  Acute comminuted mildly displaced intertrochanteric fracture of the right  proximal femur. [ER]     Coagulation studies mildly elevated. Patient is not on anticoagulation. [ER]     Stable anemia and leukopenia. No thrombocytopenia. [ER]     Mild acute kidney injury with creatinine of 1.4. Will give fluids. No significant electrolyte abnormalities. [ER]     Liver function testing unremarkable. [ER]     Patient denies any cough or fever. Will obtain procalcitonin, at this time no clinical evidence of pneumonia. Patient denies any pain in his chest, no tenderness to palpation over the right ribs or anywhere on the chest wall. Low clinical suspicion for rib fracture at this time. Do not feel that patient requires transfer for trauma evaluation at this time. [ER]     Flu and Covid negative. [ER]     Spoke to Dr. Nata Atkinson of orthopedics. He did request repeat single view pelvic x-ray without pelvic binder in place. He also requested 2 view x-ray of the femur and a CT of the right hip. [ER]     The Ekg interpreted by me shows  normal pacemaker rhythm with a rate of 69  Axis is   Left axis deviation  QTc is  prolonged  Intervals and Durations are unremarkable.       ST Segments: nonspecific changes  Significant change from prior EKG dated 6/19/21    Patient denies any chest pain or shortness of breath. [ER]           Head Injury Risk Assessment Questions:   Age over 61 Yes   New focal neurologic deficit No   Persistent altered Mental Status No   Suspected skull fracture* No   Clinical intoxication No   Anticoagulation or antiplatelet meds Yes, Aspirin 81mg   Bleeding disorder No   Vomiting > 1 time No   Amnesia > 30 minutes No   Dangerous mechanism** No   Posttraumatic seizure No   High suspicion for serious brain injury No     I completed a structured, evidence-based clinical evaluation to screen for significant intracranial injury in this patient. The evidence indicates that the patient is very low risk for intracranial injury requiring surgical intervention, and this is consistent with my clinical intuition. Using Shared Decision Making, I have discussed with the patient my clinical impression and the result of an evidence-based clinical evaluation to screen for significant intracranial injury, as well as the risk of further testing. The evidence shows that the risk for intracranial injury requiring surgical intervention is well below 1%. Although the risk of intracranial injury has not been completely eliminated, the risks of further testing or being hospitalized for intracranial injury likely exceed any potential benefit, and the patient agrees with not pursuing further emergent evaluation or being hospitalized for intracranial injury at this time. Based on results of work-up, I am concerned for right femur fracture. At this time, do feel the patient requires admission for further work-up and management. Orthopedics with plan to take patient to the operating room today. Patient n.p.o. Patient will require further medical clearance for surgery. Discussed the patient with hospital team, Dr. Galen Ramírez, patient to be admitted to [facility]. Kiko Delgado CLINICAL IMPRESSION  1. Closed displaced intertrochanteric fracture of right femur, initial encounter ([facility] [address]    2.  PEDRO (acute

## 2021-11-15 NOTE — PLAN OF CARE
Problem: Falls - Risk of:  Goal: Will remain free from falls  Description: Will remain free from falls  11/14/2021 2322 by Armani Alvarez RN  Outcome: Ongoing  11/14/2021 1902 by Claudine Hernandez RN  Outcome: Ongoing  Goal: Absence of physical injury  Description: Absence of physical injury  11/14/2021 2322 by Armani Alvarez RN  Outcome: Ongoing  11/14/2021 1902 by Claudine Hernandez RN  Outcome: Ongoing     Problem: Skin Integrity:  Goal: Will show no infection signs and symptoms  Description: Will show no infection signs and symptoms  11/14/2021 2322 by Armani Alvarez RN  Outcome: Ongoing  11/14/2021 1902 by Claudine Hernandez RN  Outcome: Ongoing  Goal: Absence of new skin breakdown  Description: Absence of new skin breakdown  11/14/2021 2322 by Armani Alvarez RN  Outcome: Ongoing  11/14/2021 1902 by Claudine Hernandez RN  Outcome: Ongoing     Problem: Pain:  Goal: Pain level will decrease  Description: Pain level will decrease  Outcome: Ongoing  Goal: Control of acute pain  Description: Control of acute pain  Outcome: Ongoing  Goal: Control of chronic pain  Description: Control of chronic pain  Outcome: Ongoing

## 2021-11-15 NOTE — PROGRESS NOTES
Inpatient Occupational Therapy  Evaluation and Treatment    Unit: 2 [place]  Date:  11/15/2021  Patient Name:    Manuelito Mackay  Admitting diagnosis:  PEDRO (acute kidney injury) [facility]) [N17.9]  Fall, initial encounter [W19. XXXA]  Closed displaced intertrochanteric fracture of right femur, initial encounter ([facility] [address] Tre [place]ing  Fall at home, initial encounter [W19. Jenna Maria Dolores, U57.425]  Admit Date:  11/14/2021  Precautions/Restrictions/WB Status/ Lines/ Wounds/ Oxygen: fall risk, IV, bed/chair alarm, serna catheter  and WB restrictions (R LE WBAT)     Treatment Time: 9:35 -10:10  Treatment Number: 1     Billable Treatment Time: 25 minutes   Total Treatment Time:   35  minutes    Patient Goals for Therapy:  None stated as pt was very lethargic       Discharge Recommendations: SNF  DME needs for discharge: defer to facility       Therapy recommendations for staff:   Due to pt lethargy did not transfer pt this eval, await PT/OT tx for recommendations, bed level for now       History of Present Illness: Per Dr. Mendoza Half note:  Emiliano Gore patient is a 80 y. o. male with a history of syncope, TIA, and afib on xarelto, who presents with above chief complaint.  Pt woke up at 3am this morning to use the restroom and fell backwards, landing on his right side. He felt immediate pain in his hip. He lives with his son and daughter in law, who found him down on the ground and called 911. He denies any fever, chills, nausea. He denies hitting his head and denies LOC. His pain is increased with movement and tolerable at rest. He denies any numbness or tingling into his lower extremities.      11/14: S/p OPEN REDUCTION INTERNAL FIXATION RIGHT PROXIMAL FEMUR COMMINUTED FRACTURE WITH GAMMA NAIL    Home Health S4 Level Recommendation:  NA  AM-PAC Score:      Preadmission Environment    Pt.  Lives with family (son and daugther in law )  Home environment:  two story home can stay on first floor   Steps to enter first floor:   2 steps to enter    Steps to second floor: Full flight of 12-13  Bathroom:  Walk-in Shower, Grab bars, Shower Chair  and standard toilet   Equipment [facility]:  [facility]      Preadmission Status / PLOF:  History of falls   Unknown  Pt. Able to drive   No  Pt Fully independent with ADL's  Yes  Pt. Required assistance from family for:  Cleaning and Laundry Pt would assist with meals     Pt. Fully independent for transfers and gait and walked with: No Device    Pain  Yes  Rating:moderate  Location: L LE pt winced and moaned with movement for supine to sit   Pain Medicine Status: No request made  Pt recieved morphine at 8pm on 11/14, questioning if this is causing severe lethargy     Cognition    A&O orientation not directly assessed. due to pt lethargy he was unable to fully answer any   Able to follow uanble to follow commands due to lethargy     Subjective  Patient lying supine in bed with no family present - no visitors present due to COVID-19 restrictions  Pt agreeable to this OT eval & tx. Upper Extremity ROM:    Unable to full assess due to pt lethargy, WFL PROM with bed mobility     Upper Extremity Strength:    Formal strength testing deferred due to lethargy     Upper Extremity Sensation    responded to touch     Upper Extremity Proprioception:  NT    Coordination and Tone  NT    Balance  Functional Sitting Balance:  Impaired Max A x2 to sit EOB due to lethargy   Functional Standing Balance:NT    Bed mobility:    Supine to sit:   Total A of 2  Sit to supine: Total A of 2  Rolling: Total A  and Total A of 2  Scooting in sitting:   Total A of 2  Scooting to head of bed:   Total A of 2    Bridging:   N/A    Transfers:  Not assessed due to pt lethargy   Sit to stand:  N/A  Stand to sit:  N/A  Bed to chair:   N/A  Standard toilet: N/A  Bed to BSC:  N/A    Dressing:      UE:   N/A  LE:    Total A don socks     Bathing:    UE:  N/A  LE:  N/A    Eating:   N/A pt breakfast tray still uneaten due to pt lethargy not safe to eat at this time Toileting: Total A recommended purewick as pt was soiled when therapist entered room     Activity Tolerance   Pt completed therapy session with Pain noted with movement of L LE   BP: 76/48 sitting EOB   103/58 supine     Positioning Needs: In bed, call light and needs in reach. Exercise / Activities Initiated:   N/A    Patient/Family Education:   Role of OT  Recommendations for DC    Assessment of Deficits: Pt seen for Occupational therapy evaluation in acute care setting. Pt demonstrated decreased Activity tolerance, ADLs, IADLs, Balance , Bathing, Bed mobility, Dressing, Safety Awareness, Strength and Transfers. Pt functioning below baseline and will likely benefit from skilled occupational therapy services to maximize safety and independence. Goal(s) : To be met in 3 Visits:  1). Bed to toilet/BSC: Mod A    To be met in 5 Visits:  1). Supine to/from Sit:  Mod A  2). Upper Body Bathing:   Min A  3). Lower Body Bathing: Mod A  4). Upper Body Dressing:  Min A  5). Lower Body Dressing: Mod A  6). Pt to demonstrate UE exs x 15 reps with minimal cues    Rehabilitation Potential:  Fair for goals listed above. Strengths for achieving goals include: PLOF and Family Support  Barriers to achieving goals include:  Pain and Other: lethargy      Plan: To be seen 3-5 x/wk while in acute care setting for therapeutic exercises, bed mobility, transfers, dressing, bathing, family/patient education, ADL/IADL retraining, energy conservation training.      Lobo Willoughby OTR/L #[id number]            If patient discharges from this facility prior to next visit, this note will serve as the Discharge Summary

## 2021-11-15 NOTE — PLAN OF CARE
Monitoring patient skin integrity for skin breakdown, turning and repositioning q2h per protocol. Patient demonstrates turning and repositioning self. Falling star program remains in place. Call light and personal belongings within reach. Frequent visual monitoring continues. Toileting program inplace. Patient assisted in turning/repositioning at least once every 2 hours, and on a prn basis.

## 2021-11-15 NOTE — PROGRESS NOTES
4 Eyes Skin Assessment     The patient is being assess for   Admission    I agree that 2 RN's have performed a thorough Head to Toe Skin Assessment on the patient. ALL assessment sites listed below have been assessed. Areas assessed by both nurses:   [x]   Head, Face, and Ears   [x]   Shoulders, Back, and Chest, Abdomen  [x]   Arms, Elbows, and Hands   [x]   Coccyx, Sacrum, and Ischium  [x]   Legs, Feet, and Heels           **SHARE this note so that the co-signing nurse is able to place an eSignature**    Co-signer eSignature: Electronically signed by Job Smith RN on 11/14/21 at 7:05 PM EST    Does the Patient have Skin Breakdown?   No dressing x 2 to right hip, r/t repair, scattered bruising but skin intact          Solomon Prevention initiated:  Yes   Wound Care Orders initiated:  NA      [facility] nurse consulted for Pressure Injury (Stage 3,4, Unstageable, DTI, NWPT, Complex wounds)and New or Established Ostomies:  NA      Primary Nurse eSignature: {Esignature:[id number]}

## 2021-11-15 NOTE — PROGRESS NOTES
Progress Note    Admit Date:  11/14/2021    80 y.o. male with PMH of CAD, PAF on Xarelto at home, sinoatrial node dysfunction, status post Medtronic dual-chamber pacemaker placed in September 2002 he follows with the [facility] for his cardiology care, TIA, diastolic congestive heart failure, and hypertension who presents to [facility] with c/o fall. Admitted for displaced intertochanteric fx of right proximal femur. Subjective:  Mr. Sharmin Beckett states he is hurting. He is tired today. Objective:   Patient Vitals for the past 4 hrs:   BP Temp Temp src Pulse Resp SpO2   11/15/21 0745 (!) 85/49 98.6 °F (37 °C) Axillary 78 18 95 %            Intake/Output Summary (Last 24 hours) at 11/15/2021 0940  Last data filed at 11/15/2021 0223  Gross per 24 hour   Intake 1996.63 ml   Output    Net 1996.63 ml       Physical Exam:  Gen: No distress. Alert. Eyes: PERRL. No sclera icterus. No conjunctival injection. ENT: No discharge. Pharynx clear. Neck: No JVD. Trachea midline. Resp: No accessory muscle use. No crackles. No wheezes. No rhonchi. CV: Regular rate. Regular rhythm. No murmur. No rub. No edema. GI: Non-tender. Non-distended. Normal bowel sounds. No hernia. Skin: Warm and dry. No nodule on exposed extremities. No rash on exposed extremities. +BUE ecchymosis. Dressing intact right hip  M/S: No cyanosis. +edema and TTP to right hip + pedal pulses, warm to touch  Neuro: lethargic. Follows commands. Psych:  No anxiety or agitation.      Data:  CBC:   Recent Labs     11/14/21  0555 11/15/21  0523   WBC 3.8* 6.0   HGB 10.9* 7.7*   HCT 32.1* 23.2*   .7* 113.8*    165     BMP:   Recent Labs     11/14/21  0555 11/15/21  0523    135*   K 4.7 5.2*    103   CO2 24 18*   BUN 31* 43*   CREATININE 1.4* 2.4*     LIVER PROFILE:   Recent Labs     11/14/21  0555   AST 16   ALT 9*   BILITOT 0.4   ALKPHOS 77     PT/INR:   Recent Labs     11/14/21  0555   PROTIME 17.4*   INR 1.51* CULTURES  COVID/Influenza: not detected    RADIOLOGY  FLUORO FOR SURGICAL PROCEDURES   Final Result   Intraprocedural fluoroscopic spot images as above. See separate procedure   report for more information. CT HIP RIGHT WO CONTRAST   Final Result   Comminuted displaced intertrochanteric right femoral fracture. XR FEMUR RIGHT (MIN 2 VIEWS)   Final Result   Intertrochanteric right femur fracture         XR CHEST PORTABLE   Preliminary Result   New focal airspace opacity in left mid lung suspicious for pneumonia. There   is a persistent airspace opacity in the right mid lung not significantly   changed from the prior exam.      Questionable age-indeterminate right lateral 2nd rib fracture. Recommend   correlation with focal area of pain. An acute nondisplaced fracture is not   excluded given trauma. No pneumothorax. XR PELVIS (1-2 VIEWS)   Preliminary Result   Acute comminuted mildly displaced intertrochanteric fracture of the right   proximal femur. Jonathon Bee MD have reviewed the chart on Morris Cerrato and personally interviewed and examined patient, reviewed the data (labs and imaging) and after discussion with my PA formulated the plan. Agree with note with the following edits. HPI:     80-year-old white male with a history of CAD, paroxysmal atrial fibrillation on Xarelto, dual-chamber pacemaker, TIA, diastolic heart failure chronic, hypertension who came to the ER with a fall. Work-up showed displaced intertrochanteric fracture of the right proximal femur. Blood pressures running low this morning. I did order 2 fluid boluses. H&H has dropped. Creatinine is increased from 1.4-2.4. Today's postop day 1. I reviewed the patient's Past Medical History, Past Surgical History, Medications, and Allergies.      Physical exam:    BP (!) 101/58   Pulse 78   Temp 98.6 °F (37 °C) (Axillary)   Resp 18   Ht 6' (1.829 m)   Wt 184 lb 6.4 oz (83.6 kg) SpO2 95%   BMI 25.01 kg/m²     Gen: No distress. Alert. Eyes: PERRL. No sclera icterus. No conjunctival injection. ENT: No discharge. Pharynx clear. Neck: Trachea midline. Normal thyroid. Resp: No accessory muscle use. No crackles. No wheezes. No rhonchi. No dullness on percussion. CV: Regular rate. Regular rhythm. No murmur or rub. No edema. Assessment/Plan:  Fall  Hip fracture  - ortho consulted and plans to take patient to surgery today, if medically cleared  - cardiology consulted for clearance due to EKG changes and cardiac history  - spoke with Dr. Dante Go and ok from cardiac standpoint for surgery Coreg  - S/p ORIF of right hip. POD#1  - was to go to PCU after surgery with telemetry monitoring. Currently on med-surg.     PEDRO  Hyperkalemia  - baseline 1-1.2  - 1.4 on admission   - IVFs  - repeat BMP with creatinine 2.4  - monitor BMP Closely. Hypotensive  - BP 74/42. IVF's. Received bolus. - monitor BP closely.      Acute blood loss anemia due to being post-op.   - Hgb 7.7  - monitor H/H closely.      Abnormal CXR  - new focal airspace opacity LML, suspicious for pneumonia  - pt on room air, denies any shortness of breath, no cough, and afebrile  - WBC 3.9-- appears to be chronically on the lower side  - add procal: 0.07  - monitor      Right lateral 2nd rib fracture  - age- indeterminate right lateral 2nd rib fracture, possible acute nondisplaced fracture is not excluded given trauma, no pneumothorax  - pt denies any pain or discomfort   - monitor      CAD  - pt with possible EKG changes, EKG reviewed with cardiology   - troponin < 0.01 x3  - denies any chest pain  - continue ASA, BB, and statin      Sinoatrial node dysfunction  Dual chamber PPM (9/2002) - Medtronic dual-chamber   - follows with [facility] cardiology   - Cardiology consulted.      PAF   - on Xarelto at home  - last dose given yesterday  - can resume when ok from an ortho standpoint     Macrocytic Anemia  - on B12 injections  - stable, monitor      Chronic dCHF  - appears compensated   - monitor with IVF at 100 ml/hr  - continue daily weights, low sodium diet, strict O/O     HTN  - controlled continue home meds: labetalol      H/O TIA          DVT Prophylaxis: Xarelto     Diet: ADULT DIET;  Regular  Code Status: Full Code    Skylar Espinal FNP-C  11/15/2021       Jacquelin Cooks, MD 11/15/2021 1:31 PM

## 2021-11-15 NOTE — CARE COORDINATION
Deejay Castro CM spoke with the Pt's DIL- Kelley Tristan this am who requests referral to [facility]. Ref. made, per Carlin Right, she is OON with [facility]. Offered sister facilities, [facility] or [facility], who are in network. Message left for Kelley Tristan to call back for discussion and assessment. Addendum 0949am: spoke to Kelley Tristan via TC. New referral made to [facility] per Sheryl's request. Awaiting decision.

## 2021-11-15 NOTE — PROGRESS NOTES
Inpatient Physical Therapy Evaluation and Treatment    Unit: [facility]  Date:  11/15/2021  Patient Name:    Regla Phillips  Admitting diagnosis:  PEDRO (acute kidney injury) [facility]) [N17.9]  Fall, initial encounter [W19. XXXA]  Closed displaced intertrochanteric fracture of right femur, initial encounter ([facility] [address] Raynald Petit  Fall at home, initial encounter [W19. [facility], Y77.360]  Admit Date:  11/14/2021  Precautions/Restrictions/WB Status/ Lines/ Wounds/ Oxygen: Fall risk, Bed/chair alarm, Lines -IV, Telemetry and WB Restrictions (RLE WBAT)    Treatment Time:  9:35 - 10:10  Treatment Number:  1   Timed Code Treatment Minutes: 35 minutes  Total Treatment Minutes:  25  minutes    Patient Goals for Therapy: none stated          Discharge Recommendations: SNF  DME needs for discharge: defer to facility       Therapy recommendation for EMS Transport: requires transport by cot due to difficulty with transfers    Therapy recommendations for staff:   Assist of 2 for bed mobility    History of Present Illness: Per Dr. Parker Quan note: The patient is a 80 y.o. male with a history of syncope, TIA, and afib on xarelto, who presents with above chief complaint. Pt woke up at 3am this morning to use the restroom and fell backwards, landing on his right side. He felt immediate pain in his hip. He lives with his son and daughter in law, who found him down on the ground and called 911. He denies any fever, chills, nausea. He denies hitting his head and denies LOC. His pain is increased with movement and tolerable at rest. He denies any numbness or tingling into his lower extremities. 11/14: S/p OPEN REDUCTION INTERNAL FIXATION RIGHT PROXIMAL FEMUR COMMINUTED FRACTURE WITH GAMMA NAIL       Home Health S4 Level Recommendation:  NA  AM-PAC Mobility Score    AM-PAC Inpatient Mobility Raw Score : 7     Regla Phillips scored a 7/24 on the AM-PAC short mobility form.  Current research shows that an AM-PAC score of 17 or less is typically not associated with a discharge to the patient's home setting. Based on the patient's AM-PAC score and their current functional mobility deficits, it is recommended that the patient have 3-5 sessions per week of Physical Therapy at d/c to increase the patient's independence. Please see assessment section for further patient specific details. If patient discharges prior to next session this note will serve as a discharge summary. Please see below for the latest assessment towards goals. Preadmission Environment    Pt unable to provide info. Info below obtained from pervious admission:  Social/Functional History  Lives With: Family(son and his wife)  Type of Home: House  Home Layout: Two level, Able to Live on Main level with bedroom/bathroom  Home Access: Stairs to enter with rails  Entrance Stairs - Number of Steps: 2 TERRI  Entrance Stairs - Rails: Left  Bathroom Shower/Tub: Walk-in shower, Shower chair with back  Bathroom Toilet: Standard  Bathroom Equipment: Shower chair, Grab bars in [address] [place]: [place], 4 wheeled walker  Receives Help From: Family  ADL Assistance: Independent  Homemaking Responsibilities: Yes  Meal Prep Responsibility: Secondary  Laundry Responsibility: No  Cleaning Responsibility: No  Ambulation Assistance: Independent  Transfer Assistance: Independent  Active : No  Patient's  Info: son provides transport  Mode of Transportation: Family  Occupation: Retired  Type of occupation: farming, water delivery for [employer],   Leisure & Hobbies: reading    Pain   pt unable to state  Location:   Rating: NA /10  Pain Medicine Status: Denies need    Cognition    A&O Person   Able to follow not following commands consistently, pt very groggy    Subjective  Patient lying supine in bed with no family present. Son arrived at end of session. Pt agreeable to this PT eval & tx. Upper Extremity ROM/Strength  Please see OT evaluation.       Lower Extremity ROM / Strength   AROM WFL: Yes  ROM limitations: limited R hip, not fully assessed 2/2 recent sx    BLE strength impaired, but not formally assessed with MMT. Lower Extremity Sensation    NT    Lower Extremity Proprioception:   NT    Coordination and Tone  NT    Balance  Sitting:  Poor; Max A   Comments: sat EOB ~8 min    Standing: Not tested; Not Tested  Comments:     Bed Mobility   Supine to Sit:    Max A  and 2 persons  Sit to Supine:   Max A  and 2 persons  Rolling: Max A  and 2 persons  Scooting in sitting: Max A  and 2 persons  Scooting in supine: Total A and 2 persons    Transfer Training     Sit to stand:   Not Tested  Stand to sit:   Not Tested  Bed to Chair:   Not Tested with use of N/A    Gait gait deferred due to difficulty with transfers; pt ambulated 0 ft. Stair Training deferred, pt unsafe/ not appropriate to complete stairs at this time    Activity Tolerance   Pt completed therapy session with poor tolerance 2/2 orthostatic hypotension. BP- 76/48 sitting EOB   BP - 03/58 supine     Positioning Needs   Pt in bed, alarm set, positioned in proper neutral alignment and pressure relief provided. Call light provided and all needs within reach    Exercises Initiated  Carly deferred secondary to orthostatic hypotension and grogginess  NA    Other  None. Patient/Family Education   Pt educated on role of inpatient PT, POC, importance of continued activity, calling for assist with mobility. Assessment  Pt seen for Physical Therapy evaluation in acute care setting. Pt demonstrated decreased Activity tolerance, Balance, ROM, Safety and Strength as well as decreased independence with Ambulation, Bed Mobility  and Transfers.      Recommending SNF upon discharge as patient functioning well below baseline, demonstrates good rehab potential and unable to return home due to inability to negotiate stairs to enter home/bedroom/bathroom, burden of care beyond caregiver ability, home environment not conducive to patient

## 2021-11-15 NOTE — OP NOTE
[address]                 [address] [address]                                OPERATIVE REPORT    PATIENT NAME: Chen Rajan                       :        1924  MED REC NO:   [id number]                          ROOM:       0210  ACCOUNT NO:   [de-identified]                           ADMIT DATE: 2021  PROVIDER:     Shivam Sultana MD    DATE OF PROCEDURE:  2021     PRIMARY CARE PROVIDER:  Jaswant Gore CNP    PREOPERATIVE DIAGNOSIS:  Right proximal femur intertrochanteric  comminuted displaced three-part fracture. POSTOPERATIVE DIAGNOSIS:  Right proximal femur intertrochanteric  comminuted displaced three-part fracture. OPERATION PERFORMED:  Open treatment of right proximal femur  intertrochanteric comminuted displaced fracture open reduction with open  treatment using a locked Gamma nail. SURGEON:  Shivam Sultana MD    ASSISTANT:  Maura Oates Surgical Assistant. ANESTHESIA:  General anesthesia. ESTIMATED BLOOD LOSS:  Less than 50 mL. COMPLICATIONS:  None. IMPLANTS USED:  1.  Kia Tritanium long Gamma nail, size 10 x 420.  2.  U-plate, 379-BT hip screw and two distal locking screws. INDICATIONS:  This is a 70-year-old white male active for his age  was at home in the bathroom and turned to look for the snow from the  window and somehow lost his balance and fell on his right side. He was  brought by EMS to [facility] where he was found to have a  comminuted displaced intertrochanteric femur fracture. All risks,  benefits, and alternatives were discussed with the patient and his  family including his son and daughter-in-law and they agreed to proceed  with surgical treatment. OPERATIVE PROCEDURE:  The patient's right hip was marked. He received 2  gm Ancef IV preoperatively. The patient was then brought to the  operating room and underwent general anesthesia. He was then placed on  the [place]onet table.   The right foot was secured in a foot gillis and the  left leg in a leg bolster. We were able to achieve a good closed  reduction. At this point, we had the right hip and lower extremity  prepped and draped in regular sterile routine fashion. A time-out was  called, confirming the patient's name, site, and procedure. A small incision was made proximal to the greater trochanter, and we  used an awl to create the entry point. That was confirmed with the  C-arm in two planes to be in good position. The awl was then advanced  into the proximal femur, and the guidewire was advanced distally. At  this point, we measured 420 for the Gamma nail. We then used a 15-mm  opening reamer to open the entry point. At this point, we passed the  Gamma nail over the guidewire, and then the wire was removed. We made  another small incision for the hip screw, and the pin was then placed in  the center part of the head and neck. A 100-cm U-plate was chosen given  the comminution of the fracture. We then put the screw in the  appropriate position and compression was performed. We placed the  U-plate insertion to control the rotation. That was secured with awl  and set screw. At this point, we used aiming arm and we placed two  distal locking screws. Instrumentation was removed and the C-arm  confirmed anatomic reduction with good position of the Gamma nail as  well as the hip screw. At this point, we irrigated the incision  copiously with normal saline. It was closed with a 2-0 and 3-0 Vicryl  and skin with staple. Dressing was applied in the form of Xeroform,  4x4, and tape. The patient tolerated the procedure well and was taken to the Recovery  in stable condition. POSTOPERATIVE PLAN:  The patient will be readmitted as an inpatient. He  will start PT and OT with weightbearing as tolerated. He will need  rehab placement.         Arleth Rivas MD    D: 11/14/2021 13:52:08       T: 11/14/2021 23:43:27 [id number]  Job#: [id number]     Doc#: [id number]    CC:  Isabel Antoine

## 2021-11-16 NOTE — PROGRESS NOTES
Writer was walking by heart monitor station when patients monitor started alarming Asystole. Monitor room was calling as I started CPR. Code blue called and crash cart and staff arrived. Refer to code charting for further information.

## 2021-11-16 NOTE — PROGRESS NOTES
Checked on patient. Patient sleeping and snoring. Patient patient does not appear to be in any distress. Will continue to monitor.

## 2021-11-16 NOTE — SIGNIFICANT EVENT
CODE BLUE overhead called, responded to to [facility] floor active CPR ongoing, ventilations with BVM. Patient was subsequently intubated, ACLS initiated with assistance of [place] device for CPR. Patient was in hospital for hip fracture reportedly had episodes of hypotension. Pertinent labs were reviewed from earlier in the day. Patient reportedly had episode of bradycardia with subsequent asystole on monitor just prior to [address] being called. See RN note for code summary. Intubation Procedure Note    Indication: Cardiac arrest    Consent: Unable to be obtained due to the emergent nature of this procedure. Medications Used: None    Procedure: The patient was placed in the appropriate position. Cricoid pressure was not required. Intubation was performed using video allergic scope a 7.5 cuffed endotracheal tube. The cuff was then inflated and the tube was secured appropriately at a distance of 25 cm to the dental ridge. Initial confirmation of placement included bilateral breath sounds and an end tidal CO2 detector. A chest x-ray to verify correct placement of the tube was not ordered due to cardiac arrest.    The patient tolerated the procedure well. Complications: None        CODE SUMMARY  (see RN note for details) - Initial rhythm  asystole with subsequent rhythm change to V. fib, V. tach to PEA    Medications given:  epinephrine:  Yes  atropine:  No  sodium bicarbonate:  Yes  calcium:  No  D50:  No  magnesium:  No  Amiodarone:  Yes   Procainamide:  No  IVF 1000mL:  No     Interventions:  defibrillation:  Yes   cardioversion:  No   external pacer:  No    Final rhythm:  PEA     Patient pronounced dead at 1806 on 11/15/2021      DEATH NOTE    PATIENT NAME: Michael Son  YOB: 1924  MEDICAL RECORD NO. [id number]  DATE: 11/15/2021  PRIMARY CARE PHYSICIAN: DAVID DECKER - CNP    DIAGNOSIS OF DEATH     I have confirmed the death of this patient in accordance with accepted medical standards. The patient is dead as evidenced by cardiac death or cessation of brain function:    Cardiac Death (check all that apply):     [x]  Absence of respiratory effort by observation     [x]  Absence of pulse by palpation     []  Absence of blood pressure by sphygmomanometry     [x]  Absence of sustainable cardiac rhythm by monitor        CERTIFICATION OF DEATH     I have pronounced the patient dead on:     Date: 11/15/2021 at 1806    NOTIFICATIONS       Family notified: Name and/or Relationship:                                                          [x]  Per Nursing     notified (Name):                                                        [x]  Per [address] Oak[address] DO  11/15/2021, 11:41 PM

## 2021-11-16 NOTE — PROGRESS NOTES
Checked on patient. Talked to patient and helped do a complete bed change. Blood pressure stable at this time.

## 2021-11-23 NOTE — DISCHARGE SUMMARY
Death Note            See my progress note from the same day. Cause of death: Pulseless electrical activity. Secondary cause of death coronary artery    26-year-old white male with a history of CAD, paroxysmal atrial fibrillation, sinus atrial node dysfunction status post dual-chamber pacemaker history of TIA, history of diastolic heart failure, history of hypertension was admitted to hospital after a fall. Patient had surgery on his right hip for intertrochanteric fracture. On 11/15/2021 nurse found him to have asystole on the heart monitor. CPR was started. CODE BLUE was called. Patient is intubated. In spite of maximal resuscitation efforts the patient . Family was informed. They did come to visit the patient.       Jose Ray MD 2021 6:16 PM

## 2021-12-14 NOTE — PROGRESS NOTES
Physician Progress Note      Haider Gottlieb  CSN #:                  [id number]  :                       3/17/1924  ADMIT DATE:       2021 5:32 AM  R[facility] DATE:        11/15/2021 9:58 PM  RESPONDING  PROVIDER #:        Ronnie Olivo MD          QUERY TEXT:    Pt admitted with femur fracture. Code blue was called and he was intubated. He did . If possible, please document in the progress notes and   discharge summary if you are evaluating and/or treating any of the following: The medical record reflects the following:  Risk Factors: advanced age, multiple comorbid conditions  Clinical Indicators: *code blue called, CPR initiated and pt intubated  Treatment: intubation, CPR    Thank you  Vaughn Woodall RN, CRCR, [email or web address]  Options provided:  -- Acute respiratory failure with hypoxia  -- Other - I will add my own diagnosis  -- Disagree - Not applicable / Not valid  -- Disagree - Clinically unable to determine / Unknown  -- Refer to Clinical Documentation Reviewer    PROVIDER RESPONSE TEXT:    This patient is in acute respiratory failure with hypoxia.     Query created by: Kaushal Thomas on 2021 2:58 PM      Electronically signed by:  Ronnie Olivo MD 2021 6:54 AM

## 2025-02-23 NOTE — PROGRESS NOTES
Hospitalist Progress Note      PCP: DAVID Mccain CNP    Date of Admission: 3/30/2021    Chief Complaint: Cough, shortness of breath    Hospital Course: Presented with cough and shortness of breath. Diagnosed with community-acquired pneumonia. Started on IV antibiotics. Received Pfizer COVID-19 vaccine first dose 2 weeks ago. COVID-19 PCR is negative. Feels slightly stronger than yesterday. Subjective: No chest pain. Dry cough. Mild shortness of breath. No nausea or vomiting. Subjectively better than yesterday. Medications:  Reviewed    Infusion Medications    sodium chloride       Scheduled Medications    aspirin  81 mg Oral Daily    atorvastatin  40 mg Oral Nightly    labetalol  300 mg Oral BID    sodium chloride flush  10 mL Intravenous 2 times per day    enoxaparin  40 mg Subcutaneous Daily    azithromycin  500 mg Intravenous Q24H    cefTRIAXone (ROCEPHIN) IV  1,000 mg Intravenous Q24H    lactobacillus  1 capsule Oral Daily with breakfast     PRN Meds: sodium chloride flush, sodium chloride, promethazine **OR** ondansetron, polyethylene glycol, acetaminophen **OR** acetaminophen, ipratropium-albuterol      Intake/Output Summary (Last 24 hours) at 4/1/2021 1056  Last data filed at 4/1/2021 0934  Gross per 24 hour   Intake 360 ml   Output 2025 ml   Net -1665 ml       Physical Exam Performed:    BP (!) 145/62   Pulse 65   Temp 98.1 °F (36.7 °C) (Oral)   Resp 16   Ht 6' (1.829 m)   Wt 174 lb 11.2 oz (79.2 kg)   SpO2 96%   BMI 23.69 kg/m²     General appearance: No apparent distress, appears stated age and cooperative. HEENT: Pupils equal, round, and reactive to light. Conjunctivae/corneas clear. Neck: Supple, with full range of motion. No jugular venous distention. Trachea midline. Respiratory:  Normal respiratory effort. Right posterior inspiratory crackles. No wheezes. No rhonchi.   Cardiovascular: Regular rate and rhythm with normal S1/S2 without murmurs, rubs or gallops. Abdomen: Soft, non-tender, non-distended with normal bowel sounds. Musculoskeletal: No clubbing, cyanosis or edema bilaterally. Full range of motion without deformity. Skin: Skin color, texture, turgor normal.  No rashes or lesions. Neurologic:  Neurovascularly intact without any focal sensory/motor deficits. Cranial nerves: II-XII intact, grossly non-focal.  Psychiatric: Alert and oriented, thought content appropriate, normal insight  Capillary Refill: Brisk,< 3 seconds   Peripheral Pulses: +2 palpable, equal bilaterally     I examined the patient today (04/01/21). Physical exam is similar to yesterday (3/31). Labs:   Recent Labs     03/30/21  1903 03/31/21  0630 04/01/21  0603   WBC 7.0 5.6 5.1   HGB 11.6* 10.0* 10.8*   HCT 33.6* 29.2* 31.7*    127* 153     Recent Labs     03/30/21  1903 03/31/21  0630 04/01/21  0603   * 136 138   K 4.5 4.5 5.0    104 104   CO2 23 25 25   BUN 24* 23* 24*   CREATININE 1.2 1.0 1.1   CALCIUM 9.1 8.6 9.1     Recent Labs     03/30/21  1903 04/01/21  0603   AST 26 22   ALT 22 17   BILITOT 0.6 0.4   ALKPHOS 100 95     No results for input(s): INR in the last 72 hours. Recent Labs     03/30/21 1903   TROPONINI <0.01       Urinalysis:      Lab Results   Component Value Date    NITRU Negative 03/30/2021    WBCUA None seen 03/03/2019    BACTERIA Rare 03/03/2019    RBCUA 10-20 03/03/2019    BLOODU Negative 03/30/2021    SPECGRAV 1.025 03/30/2021    GLUCOSEU Negative 03/30/2021       Radiology:  XR CHEST PORTABLE   Final Result   Opacity in the mid right lung peripherally concerning for infection. Pulmonary vascular congestion. Assessment/Plan:    Active Hospital Problems    Diagnosis    Community acquired pneumonia of right middle lobe of lung [J18.9]     PLAN:    Community-acquired pneumonia of the right middle lobe of the lung  Unclear bacterial type. Responding well to current antibiotic treatment  COVID-19 PCR negative.   No need for droplet plus precautions. Continue supportive management at this time. PT OT ordered    Macrocytic anemia  Vitamin B12 and folate levels are normal  Monitor CBC  Hemoglobin stable. No sign of blood loss. Dyslipidemia  Continue statin    Hypertension  Continue blood pressure medication. Currently blood pressure remains controlled    Coronary artery disease  Asymptomatic. Troponin negative. Continue aspirin, statin, beta-blocker as before. Appears inactive. Discussed with the patient. Questions answered      DVT Prophylaxis: Lovenox  Diet: DIET GENERAL;  Code Status: Full Code    PT/OT Eval Status: Ordered today    Dispo -anticipate home tomorrow with home health care, pending PT OT recommendations.     Keyona Batista MD No

## (undated) DEVICE — YANKAUER,BULB TIP,W/O VENT,RIGID,STERILE: Brand: MEDLINE

## (undated) DEVICE — 3M™ COBAN™ NL STERILE NON-LATEX SELF-ADHERENT WRAP, 2084S, 4 IN X 5 YD (10 CM X 4,5 M), 18 ROLLS/CASE: Brand: 3M™ COBAN™

## (undated) DEVICE — DRAPE C ARM UNIV W41XL74IN CLR PLAS XR VELC CLSR POLY STRP

## (undated) DEVICE — SMARTGOWN SURGICAL GOWN, XL: Brand: CONVERTORS

## (undated) DEVICE — DRAPE,REIN 53X77,STERILE: Brand: MEDLINE

## (undated) DEVICE — CIRCUIT ANES L72IN 3L BACT AND VIR FLTR EL CONN SGL LIMB

## (undated) DEVICE — GUIDE WIRE, BALL-TIPPED, STERILE

## (undated) DEVICE — DRESSING FOAM W4XL10IN SIL RECT ADH WTRPRF FLM BK W/ BORD

## (undated) DEVICE — COTTON UNDERCAST PADDING,CRIMPED FINISH: Brand: WEBRIL

## (undated) DEVICE — CLOSED TUBE CLIP: Brand: GAMMA

## (undated) DEVICE — Z DISCONTINUED PER MEDLINE USE 2752023 DRESSING FOAM W7.62XL7.62CM SIL ADH WTRPRF FLM BK SQ SHP

## (undated) DEVICE — SOLUTION IV IRRIG 500ML 0.9% SODIUM CHL 2F7123

## (undated) DEVICE — GLOVE ORANGE PI 7   MSG9070

## (undated) DEVICE — K-WIRE
Type: IMPLANTABLE DEVICE | Site: HIP | Status: NON-FUNCTIONAL
Brand: GAMMA
Removed: 2021-11-14

## (undated) DEVICE — MAJOR SET UP PK

## (undated) DEVICE — SUTURE VCRL SZ 2-0 L18IN ABSRB UD CT-1 L36MM 1/2 CIR J839D

## (undated) DEVICE — CUFF RESTRN WR OR ANK BLU FOAM AD

## (undated) DEVICE — MAT TRACK 40 X 72 IN ABSORBENT

## (undated) DEVICE — 3M™ STERI-DRAPE™  ISOLATION DRAPE WITH INCISE FILM AND POUCH 1017: Brand: STERI-DRAPE™

## (undated) DEVICE — ELECTRODE PT RET AD L9FT HI MOIST COND ADH HYDRGEL CORDED

## (undated) DEVICE — STAPLER SKIN H3.9MM WIRE DIA0.58MM CRWN 6.9MM 35 STPL FIX

## (undated) DEVICE — CHLORAPREP 26ML ORANGE

## (undated) DEVICE — TUBING, SUCTION, 3/16" X 10', STRAIGHT: Brand: MEDLINE

## (undated) DEVICE — MANIFOLD SURG NEPTUNE WST MGMT

## (undated) DEVICE — GLOVE ORANGE PI 7 1/2   MSG9075

## (undated) DEVICE — SUTURE VCRL + SZ 0 L27IN ABSRB UD L36MM CT-1 1/2 CIR VCPP41D

## (undated) DEVICE — BANDAGE E SELF CLSR 6INX5YD VELC SWIFTWRAP

## (undated) DEVICE — DRILL, AO SMALL: Brand: GAMMA

## (undated) DEVICE — PADDING CAST N ADH 12X6 IN CRIMPED FINISH 100% COTTON WBRLII